# Patient Record
Sex: FEMALE | Race: WHITE | Employment: UNEMPLOYED | URBAN - METROPOLITAN AREA
[De-identification: names, ages, dates, MRNs, and addresses within clinical notes are randomized per-mention and may not be internally consistent; named-entity substitution may affect disease eponyms.]

---

## 2017-03-07 ENCOUNTER — HOSPITAL ENCOUNTER (OUTPATIENT)
Age: 82
Setting detail: SPECIMEN
Discharge: HOME | End: 2017-03-07
Payer: MEDICARE

## 2017-03-22 LAB
POC INR: 2.7
PROTHROMBIN TIME, POC: 32.1 SEC (ref 9.6–14.4)

## 2017-04-05 ENCOUNTER — HOSPITAL ENCOUNTER (OUTPATIENT)
Age: 82
Setting detail: SPECIMEN
Discharge: HOME | End: 2017-04-05
Payer: MEDICARE

## 2017-04-05 LAB
POC INR: 1.9
PROTHROMBIN TIME, POC: 22.3 SEC (ref 9.6–14.4)

## 2017-05-03 ENCOUNTER — HOSPITAL ENCOUNTER (OUTPATIENT)
Age: 82
Setting detail: SPECIMEN
Discharge: HOME | End: 2017-05-03
Payer: MEDICARE

## 2017-05-03 LAB
POC INR: 1.5
PROTHROMBIN TIME, POC: 18.5 SEC (ref 9.6–14.4)

## 2017-05-19 ENCOUNTER — HOSPITAL ENCOUNTER (OUTPATIENT)
Age: 82
Setting detail: SPECIMEN
Discharge: HOME | End: 2017-05-19
Payer: MEDICARE

## 2017-05-19 LAB
POC INR: 1.7
PROTHROMBIN TIME, POC: 20.4 SEC (ref 9.6–14.4)

## 2017-06-02 ENCOUNTER — HOSPITAL ENCOUNTER (OUTPATIENT)
Age: 82
Setting detail: SPECIMEN
Discharge: HOME | End: 2017-06-02
Payer: MEDICARE

## 2017-06-02 LAB
POC INR: 1.8
PROTHROMBIN TIME, POC: 21.1 SEC (ref 9.6–14.4)

## 2017-07-06 ENCOUNTER — HOSPITAL ENCOUNTER (OUTPATIENT)
Age: 82
Setting detail: SPECIMEN
Discharge: HOME | End: 2017-07-06
Payer: MEDICARE

## 2017-07-06 LAB
POC INR: 2
PROTHROMBIN TIME, POC: 23.9 SEC (ref 9.6–14.4)

## 2017-08-04 ENCOUNTER — HOSPITAL ENCOUNTER (OUTPATIENT)
Age: 82
Setting detail: SPECIMEN
Discharge: HOME | End: 2017-08-04
Payer: MEDICARE

## 2017-08-04 LAB
POC INR: 2
PROTHROMBIN TIME, POC: 24.2 SEC (ref 9.6–14.4)

## 2017-09-01 ENCOUNTER — HOSPITAL ENCOUNTER (OUTPATIENT)
Age: 82
Setting detail: SPECIMEN
Discharge: HOME | End: 2017-09-01
Payer: MEDICARE

## 2017-09-01 LAB
POC INR: 1.9
PROTHROMBIN TIME, POC: 22.5 SEC (ref 9.6–14.4)

## 2017-10-04 ENCOUNTER — HOSPITAL ENCOUNTER (OUTPATIENT)
Age: 82
Setting detail: SPECIMEN
Discharge: HOME | End: 2017-10-04
Payer: MEDICARE

## 2017-10-04 LAB
POC INR: 2
PROTHROMBIN TIME, POC: 24.3 SEC (ref 9.6–14.4)

## 2017-11-04 ENCOUNTER — HOSPITAL ENCOUNTER (INPATIENT)
Age: 82
LOS: 5 days | Discharge: NURSING, REHAB OR LONG-TERM CARE | DRG: 683 | End: 2017-11-09
Attending: EMERGENCY MEDICINE | Admitting: INTERNAL MEDICINE
Payer: MEDICARE

## 2017-11-04 ENCOUNTER — APPOINTMENT (OUTPATIENT)
Dept: GENERAL RADIOLOGY | Age: 82
DRG: 683 | End: 2017-11-04
Payer: MEDICARE

## 2017-11-04 DIAGNOSIS — R53.1 GENERALIZED WEAKNESS: Primary | ICD-10-CM

## 2017-11-04 DIAGNOSIS — M62.82 NON-TRAUMATIC RHABDOMYOLYSIS: ICD-10-CM

## 2017-11-04 DIAGNOSIS — R73.9 HYPERGLYCEMIA: ICD-10-CM

## 2017-11-04 DIAGNOSIS — E86.0 DEHYDRATION: ICD-10-CM

## 2017-11-04 DIAGNOSIS — R53.83 FATIGUE, UNSPECIFIED TYPE: ICD-10-CM

## 2017-11-04 PROBLEM — C50.919 BREAST CANCER (HCC): Status: ACTIVE | Noted: 2017-11-04

## 2017-11-04 PROBLEM — N17.9 AKI (ACUTE KIDNEY INJURY) (HCC): Status: ACTIVE | Noted: 2017-11-04

## 2017-11-04 PROBLEM — I70.1 RENAL ARTERY STENOSIS (HCC): Status: ACTIVE | Noted: 2017-11-04

## 2017-11-04 PROBLEM — H10.9 CONJUNCTIVITIS: Status: ACTIVE | Noted: 2017-11-04

## 2017-11-04 PROBLEM — N18.30 CKD (CHRONIC KIDNEY DISEASE) STAGE 3, GFR 30-59 ML/MIN (HCC): Status: ACTIVE | Noted: 2017-11-04

## 2017-11-04 PROBLEM — R74.8 ELEVATED CK: Status: ACTIVE | Noted: 2017-11-04

## 2017-11-04 PROBLEM — F02.80 ALZHEIMER DISEASE (HCC): Status: ACTIVE | Noted: 2017-11-04

## 2017-11-04 PROBLEM — E03.9 HYPOTHYROIDISM: Status: ACTIVE | Noted: 2017-11-04

## 2017-11-04 PROBLEM — I47.9 PAROXYSMAL TACHYCARDIA (HCC): Status: ACTIVE | Noted: 2017-11-04

## 2017-11-04 PROBLEM — I48.91 A-FIB (HCC): Status: ACTIVE | Noted: 2017-11-04

## 2017-11-04 PROBLEM — M81.0 OSTEOPOROSIS: Status: ACTIVE | Noted: 2017-11-04

## 2017-11-04 PROBLEM — E87.20 LACTIC ACID ACIDOSIS: Status: ACTIVE | Noted: 2017-11-04

## 2017-11-04 PROBLEM — R19.7 DIARRHEA: Status: ACTIVE | Noted: 2017-11-04

## 2017-11-04 PROBLEM — G30.9 ALZHEIMER DISEASE (HCC): Status: ACTIVE | Noted: 2017-11-04

## 2017-11-04 LAB
% CKMB: 1.9 % (ref 0–3)
-: ABNORMAL
ABSOLUTE EOS #: 0.07 K/UL (ref 0–0.44)
ABSOLUTE IMMATURE GRANULOCYTE: 0.03 K/UL (ref 0–0.3)
ABSOLUTE LYMPH #: 0.87 K/UL (ref 1.1–3.7)
ABSOLUTE MONO #: 0.61 K/UL (ref 0.1–1.2)
ALBUMIN SERPL-MCNC: 3.1 G/DL (ref 3.5–5.2)
ALBUMIN/GLOBULIN RATIO: 0.8 (ref 1–2.5)
ALP BLD-CCNC: 85 U/L (ref 35–104)
ALT SERPL-CCNC: 19 U/L (ref 5–33)
AMORPHOUS: ABNORMAL
ANION GAP SERPL CALCULATED.3IONS-SCNC: 18 MMOL/L (ref 9–17)
AST SERPL-CCNC: 49 U/L
BACTERIA: ABNORMAL
BASOPHILS # BLD: 1 % (ref 0–2)
BASOPHILS ABSOLUTE: 0.05 K/UL (ref 0–0.2)
BILIRUB SERPL-MCNC: 0.38 MG/DL (ref 0.3–1.2)
BILIRUBIN DIRECT: 0.12 MG/DL
BILIRUBIN URINE: NEGATIVE
BILIRUBIN, INDIRECT: 0.26 MG/DL (ref 0–1)
BNP INTERPRETATION: ABNORMAL
BUN BLDV-MCNC: 81 MG/DL (ref 8–23)
BUN/CREAT BLD: ABNORMAL (ref 9–20)
CALCIUM IONIZED: 1.21 MMOL/L (ref 1.13–1.33)
CALCIUM SERPL-MCNC: 9 MG/DL (ref 8.6–10.4)
CASTS UA: ABNORMAL /LPF (ref 0–8)
CHLORIDE BLD-SCNC: 95 MMOL/L (ref 98–107)
CK MB: 49.5 NG/ML
CKMB INTERPRETATION: ABNORMAL
CO2: 21 MMOL/L (ref 20–31)
COLOR: YELLOW
CREAT SERPL-MCNC: 2.41 MG/DL (ref 0.5–0.9)
CREATININE URINE: 71.4 MG/DL (ref 28–217)
CRYSTALS, UA: ABNORMAL /HPF
DIFFERENTIAL TYPE: ABNORMAL
DIRECT EXAM: NORMAL
EOSINOPHILS RELATIVE PERCENT: 1 % (ref 1–4)
EPITHELIAL CELLS UA: ABNORMAL /HPF (ref 0–5)
GFR AFRICAN AMERICAN: 23 ML/MIN
GFR NON-AFRICAN AMERICAN: 19 ML/MIN
GFR SERPL CREATININE-BSD FRML MDRD: ABNORMAL ML/MIN/{1.73_M2}
GFR SERPL CREATININE-BSD FRML MDRD: ABNORMAL ML/MIN/{1.73_M2}
GLOBULIN: ABNORMAL G/DL (ref 1.5–3.8)
GLUCOSE BLD-MCNC: 264 MG/DL (ref 70–99)
GLUCOSE URINE: NEGATIVE
HCT VFR BLD CALC: 42.7 % (ref 36.3–47.1)
HEMOGLOBIN: 13.1 G/DL (ref 11.9–15.1)
IMMATURE GRANULOCYTES: 0 %
INR BLD: 2.2
KETONES, URINE: NEGATIVE
LACTIC ACID, WHOLE BLOOD: 3.4 MMOL/L (ref 0.7–2.1)
LEUKOCYTE ESTERASE, URINE: ABNORMAL
LIPASE: 35 U/L (ref 13–60)
LYMPHOCYTES # BLD: 9 % (ref 24–43)
Lab: NORMAL
MAGNESIUM: 2.3 MG/DL (ref 1.6–2.6)
MCH RBC QN AUTO: 25.7 PG (ref 25.2–33.5)
MCHC RBC AUTO-ENTMCNC: 30.7 G/DL (ref 28.4–34.8)
MCV RBC AUTO: 83.7 FL (ref 82.6–102.9)
MONOCYTES # BLD: 7 % (ref 3–12)
MUCUS: ABNORMAL
NITRITE, URINE: NEGATIVE
OTHER OBSERVATIONS UA: ABNORMAL
PDW BLD-RTO: 13.5 % (ref 11.8–14.4)
PH UA: 5 (ref 5–8)
PHOSPHORUS: 2.9 MG/DL (ref 2.6–4.5)
PLATELET # BLD: 290 K/UL (ref 138–453)
PLATELET ESTIMATE: ABNORMAL
PMV BLD AUTO: 10.8 FL (ref 8.1–13.5)
POC TROPONIN I: 0.06 NG/ML (ref 0–0.1)
POC TROPONIN INTERP: NORMAL
POTASSIUM SERPL-SCNC: 3.5 MMOL/L (ref 3.7–5.3)
PRO-BNP: ABNORMAL PG/ML
PROTEIN UA: ABNORMAL
PROTHROMBIN TIME: 23.4 SEC (ref 9.4–12.6)
RBC # BLD: 5.1 M/UL (ref 3.95–5.11)
RBC # BLD: ABNORMAL 10*6/UL
RBC UA: ABNORMAL /HPF (ref 0–4)
RENAL EPITHELIAL, UA: ABNORMAL /HPF
SEG NEUTROPHILS: 82 % (ref 36–65)
SEGMENTED NEUTROPHILS ABSOLUTE COUNT: 7.64 K/UL (ref 1.5–8.1)
SODIUM BLD-SCNC: 134 MMOL/L (ref 135–144)
SODIUM,UR: 57 MMOL/L
SPECIFIC GRAVITY UA: 1.01 (ref 1–1.03)
SPECIMEN DESCRIPTION: NORMAL
STATUS: NORMAL
TOTAL CK: 2571 U/L (ref 26–192)
TOTAL PROTEIN: 7 G/DL (ref 6.4–8.3)
TRICHOMONAS: ABNORMAL
TSH SERPL DL<=0.05 MIU/L-ACNC: 2.33 MIU/L (ref 0.3–5)
TURBIDITY: CLEAR
URINE HGB: ABNORMAL
UROBILINOGEN, URINE: NORMAL
WBC # BLD: 9.3 K/UL (ref 3.5–11.3)
WBC # BLD: ABNORMAL 10*3/UL
WBC UA: ABNORMAL /HPF (ref 0–5)
YEAST: ABNORMAL

## 2017-11-04 PROCEDURE — 2580000003 HC RX 258: Performed by: EMERGENCY MEDICINE

## 2017-11-04 PROCEDURE — 87040 BLOOD CULTURE FOR BACTERIA: CPT

## 2017-11-04 PROCEDURE — 84100 ASSAY OF PHOSPHORUS: CPT

## 2017-11-04 PROCEDURE — 83605 ASSAY OF LACTIC ACID: CPT

## 2017-11-04 PROCEDURE — 83880 ASSAY OF NATRIURETIC PEPTIDE: CPT

## 2017-11-04 PROCEDURE — 80048 BASIC METABOLIC PNL TOTAL CA: CPT

## 2017-11-04 PROCEDURE — 83735 ASSAY OF MAGNESIUM: CPT

## 2017-11-04 PROCEDURE — 93005 ELECTROCARDIOGRAM TRACING: CPT

## 2017-11-04 PROCEDURE — 85610 PROTHROMBIN TIME: CPT

## 2017-11-04 PROCEDURE — 1200000000 HC SEMI PRIVATE

## 2017-11-04 PROCEDURE — 84443 ASSAY THYROID STIM HORMONE: CPT

## 2017-11-04 PROCEDURE — 83690 ASSAY OF LIPASE: CPT

## 2017-11-04 PROCEDURE — 82570 ASSAY OF URINE CREATININE: CPT

## 2017-11-04 PROCEDURE — 71010 XR CHEST PORTABLE: CPT

## 2017-11-04 PROCEDURE — 87804 INFLUENZA ASSAY W/OPTIC: CPT

## 2017-11-04 PROCEDURE — 80076 HEPATIC FUNCTION PANEL: CPT

## 2017-11-04 PROCEDURE — 82550 ASSAY OF CK (CPK): CPT

## 2017-11-04 PROCEDURE — 82553 CREATINE MB FRACTION: CPT

## 2017-11-04 PROCEDURE — 99285 EMERGENCY DEPT VISIT HI MDM: CPT

## 2017-11-04 PROCEDURE — 36415 COLL VENOUS BLD VENIPUNCTURE: CPT

## 2017-11-04 PROCEDURE — 6370000000 HC RX 637 (ALT 250 FOR IP): Performed by: HOSPITALIST

## 2017-11-04 PROCEDURE — 82330 ASSAY OF CALCIUM: CPT

## 2017-11-04 PROCEDURE — 87205 SMEAR GRAM STAIN: CPT

## 2017-11-04 PROCEDURE — 84484 ASSAY OF TROPONIN QUANT: CPT

## 2017-11-04 PROCEDURE — 84300 ASSAY OF URINE SODIUM: CPT

## 2017-11-04 PROCEDURE — 85025 COMPLETE CBC W/AUTO DIFF WBC: CPT

## 2017-11-04 PROCEDURE — 2580000003 HC RX 258: Performed by: HOSPITALIST

## 2017-11-04 PROCEDURE — 81001 URINALYSIS AUTO W/SCOPE: CPT

## 2017-11-04 RX ORDER — LEVOTHYROXINE SODIUM 0.07 MG/1
75 TABLET ORAL DAILY
COMMUNITY

## 2017-11-04 RX ORDER — WARFARIN SODIUM 2.5 MG/1
2.5 TABLET ORAL
Status: DISCONTINUED | OUTPATIENT
Start: 2017-11-07 | End: 2017-11-05

## 2017-11-04 RX ORDER — EXEMESTANE 25 MG/1
25 TABLET ORAL DAILY
COMMUNITY

## 2017-11-04 RX ORDER — HEPARIN SODIUM 5000 [USP'U]/ML
5000 INJECTION, SOLUTION INTRAVENOUS; SUBCUTANEOUS EVERY 8 HOURS SCHEDULED
Status: DISCONTINUED | OUTPATIENT
Start: 2017-11-04 | End: 2017-11-04

## 2017-11-04 RX ORDER — DONEPEZIL HYDROCHLORIDE 10 MG/1
10 TABLET, FILM COATED ORAL 2 TIMES DAILY
Status: DISCONTINUED | OUTPATIENT
Start: 2017-11-04 | End: 2017-11-09 | Stop reason: HOSPADM

## 2017-11-04 RX ORDER — 0.9 % SODIUM CHLORIDE 0.9 %
750 INTRAVENOUS SOLUTION INTRAVENOUS ONCE
Status: COMPLETED | OUTPATIENT
Start: 2017-11-04 | End: 2017-11-04

## 2017-11-04 RX ORDER — WARFARIN SODIUM 5 MG/1
5 TABLET ORAL
Status: DISCONTINUED | OUTPATIENT
Start: 2017-11-04 | End: 2017-11-05

## 2017-11-04 RX ORDER — 0.9 % SODIUM CHLORIDE 0.9 %
1000 INTRAVENOUS SOLUTION INTRAVENOUS ONCE
Status: COMPLETED | OUTPATIENT
Start: 2017-11-04 | End: 2017-11-04

## 2017-11-04 RX ORDER — LEVOTHYROXINE SODIUM 0.07 MG/1
75 TABLET ORAL DAILY
Status: DISCONTINUED | OUTPATIENT
Start: 2017-11-05 | End: 2017-11-09 | Stop reason: HOSPADM

## 2017-11-04 RX ORDER — SODIUM CHLORIDE 0.9 % (FLUSH) 0.9 %
10 SYRINGE (ML) INJECTION PRN
Status: DISCONTINUED | OUTPATIENT
Start: 2017-11-04 | End: 2017-11-06 | Stop reason: SDUPTHER

## 2017-11-04 RX ORDER — WARFARIN SODIUM 5 MG/1
5 TABLET ORAL
COMMUNITY

## 2017-11-04 RX ORDER — SODIUM CHLORIDE 0.9 % (FLUSH) 0.9 %
10 SYRINGE (ML) INJECTION EVERY 12 HOURS SCHEDULED
Status: DISCONTINUED | OUTPATIENT
Start: 2017-11-04 | End: 2017-11-06 | Stop reason: SDUPTHER

## 2017-11-04 RX ORDER — LEVOTHYROXINE SODIUM ANHYDROUS 100 UG/5ML
75 INJECTION, POWDER, LYOPHILIZED, FOR SOLUTION INTRAVENOUS
Status: ON HOLD | COMMUNITY
End: 2017-11-04 | Stop reason: CLARIF

## 2017-11-04 RX ORDER — SODIUM CHLORIDE 9 MG/ML
INJECTION, SOLUTION INTRAVENOUS CONTINUOUS
Status: DISCONTINUED | OUTPATIENT
Start: 2017-11-04 | End: 2017-11-04

## 2017-11-04 RX ORDER — WARFARIN SODIUM 5 MG/1
5 TABLET ORAL DAILY
Status: DISCONTINUED | OUTPATIENT
Start: 2017-11-05 | End: 2017-11-04

## 2017-11-04 RX ORDER — HYDROCHLOROTHIAZIDE 25 MG/1
25 TABLET ORAL DAILY
Status: ON HOLD | COMMUNITY
End: 2017-11-09 | Stop reason: HOSPADM

## 2017-11-04 RX ORDER — ACETAMINOPHEN 325 MG/1
650 TABLET ORAL EVERY 4 HOURS PRN
Status: DISCONTINUED | OUTPATIENT
Start: 2017-11-04 | End: 2017-11-09 | Stop reason: HOSPADM

## 2017-11-04 RX ORDER — SODIUM CHLORIDE 0.9 % (FLUSH) 0.9 %
10 SYRINGE (ML) INJECTION PRN
Status: DISCONTINUED | OUTPATIENT
Start: 2017-11-04 | End: 2017-11-09 | Stop reason: HOSPADM

## 2017-11-04 RX ORDER — FAMOTIDINE 20 MG/1
20 TABLET, FILM COATED ORAL 2 TIMES DAILY
Status: DISCONTINUED | OUTPATIENT
Start: 2017-11-04 | End: 2017-11-06

## 2017-11-04 RX ORDER — ALENDRONATE SODIUM 70 MG/1
70 TABLET ORAL
COMMUNITY

## 2017-11-04 RX ORDER — DONEPEZIL HYDROCHLORIDE 10 MG/1
10 TABLET, FILM COATED ORAL 2 TIMES DAILY
COMMUNITY

## 2017-11-04 RX ORDER — ACETAMINOPHEN 325 MG/1
650 TABLET ORAL EVERY 4 HOURS PRN
Status: DISCONTINUED | OUTPATIENT
Start: 2017-11-04 | End: 2017-11-06 | Stop reason: SDUPTHER

## 2017-11-04 RX ORDER — LISINOPRIL 20 MG/1
20 TABLET ORAL DAILY
Status: ON HOLD | COMMUNITY
End: 2017-11-09 | Stop reason: HOSPADM

## 2017-11-04 RX ORDER — SODIUM CHLORIDE 9 MG/ML
INJECTION, SOLUTION INTRAVENOUS CONTINUOUS
Status: DISCONTINUED | OUTPATIENT
Start: 2017-11-04 | End: 2017-11-09

## 2017-11-04 RX ORDER — METOPROLOL SUCCINATE 25 MG/1
25 TABLET, EXTENDED RELEASE ORAL 2 TIMES DAILY
Status: ON HOLD | COMMUNITY
End: 2017-11-09 | Stop reason: HOSPADM

## 2017-11-04 RX ORDER — METOPROLOL TARTRATE 5 MG/5ML
5 INJECTION INTRAVENOUS EVERY 6 HOURS PRN
Status: DISCONTINUED | OUTPATIENT
Start: 2017-11-04 | End: 2017-11-05

## 2017-11-04 RX ORDER — SODIUM CHLORIDE 0.9 % (FLUSH) 0.9 %
10 SYRINGE (ML) INJECTION EVERY 12 HOURS SCHEDULED
Status: DISCONTINUED | OUTPATIENT
Start: 2017-11-04 | End: 2017-11-09 | Stop reason: HOSPADM

## 2017-11-04 RX ORDER — ASPIRIN 81 MG/1
81 TABLET, CHEWABLE ORAL DAILY
Status: DISCONTINUED | OUTPATIENT
Start: 2017-11-05 | End: 2017-11-09 | Stop reason: HOSPADM

## 2017-11-04 RX ORDER — ONDANSETRON 2 MG/ML
4 INJECTION INTRAMUSCULAR; INTRAVENOUS EVERY 6 HOURS PRN
Status: DISCONTINUED | OUTPATIENT
Start: 2017-11-04 | End: 2017-11-09 | Stop reason: HOSPADM

## 2017-11-04 RX ORDER — ONDANSETRON 4 MG/1
4 TABLET, FILM COATED ORAL EVERY 8 HOURS PRN
Status: DISCONTINUED | OUTPATIENT
Start: 2017-11-04 | End: 2017-11-04

## 2017-11-04 RX ORDER — CIPROFLOXACIN HYDROCHLORIDE 3.5 MG/ML
1 SOLUTION/ DROPS TOPICAL
Status: DISCONTINUED | OUTPATIENT
Start: 2017-11-04 | End: 2017-11-09 | Stop reason: HOSPADM

## 2017-11-04 RX ADMIN — WARFARIN SODIUM 5 MG: 5 TABLET ORAL at 22:39

## 2017-11-04 RX ADMIN — FAMOTIDINE 20 MG: 20 TABLET, FILM COATED ORAL at 22:39

## 2017-11-04 RX ADMIN — SODIUM CHLORIDE 1000 ML: 9 INJECTION, SOLUTION INTRAVENOUS at 16:54

## 2017-11-04 RX ADMIN — SODIUM CHLORIDE: 9 INJECTION, SOLUTION INTRAVENOUS at 22:41

## 2017-11-04 RX ADMIN — CIPROFLOXACIN HYDROCHLORIDE 1 DROP: 3.5 SOLUTION/ DROPS TOPICAL at 22:39

## 2017-11-04 RX ADMIN — SODIUM CHLORIDE 750 ML: 9 INJECTION, SOLUTION INTRAVENOUS at 18:59

## 2017-11-04 RX ADMIN — DONEPEZIL HYDROCHLORIDE 10 MG: 10 TABLET, FILM COATED ORAL at 22:39

## 2017-11-04 ASSESSMENT — PAIN SCALES - GENERAL: PAINLEVEL_OUTOF10: 0

## 2017-11-04 NOTE — ED NOTES
Bed: 07  Expected date: 11/4/17  Expected time: 4:31 PM  Means of arrival: Ambulance  Comments:  TALA [facility], RN  11/04/17 9082

## 2017-11-04 NOTE — ED PROVIDER NOTES
tachypneic with shallow. Lungs are clear. Abdomen is soft and nontender. Skin is cool but dry. Normal capillary refill. Oropharynx is moist without lesion. There is right periorbital injection with drainage compatible with blepharitis possible conjunctivitis. There is normal pupils cranial nerves extraocular movements and normal motor strength. Laboratory studies reveal mild elevation in lactate, marked elevation in BUN as well as moderate elevation in creatinine. Impression is dehydration,  Diarrhea, SIRS, no evidence of infection as yet, though urine is pending. Christina Zavala.  Jordan Hubbard MD, [facility]  Attending Emergency  Physician                Sherry Cazares MD  11/04/17 7202

## 2017-11-05 ENCOUNTER — APPOINTMENT (OUTPATIENT)
Dept: ULTRASOUND IMAGING | Age: 82
DRG: 683 | End: 2017-11-05
Payer: MEDICARE

## 2017-11-05 LAB
ABSOLUTE EOS #: 0.08 K/UL (ref 0–0.44)
ABSOLUTE IMMATURE GRANULOCYTE: 0.05 K/UL (ref 0–0.3)
ABSOLUTE LYMPH #: 0.76 K/UL (ref 1.1–3.7)
ABSOLUTE MONO #: 0.6 K/UL (ref 0.1–1.2)
ANION GAP SERPL CALCULATED.3IONS-SCNC: 14 MMOL/L (ref 9–17)
BASOPHILS # BLD: 1 % (ref 0–2)
BASOPHILS ABSOLUTE: 0.04 K/UL (ref 0–0.2)
BUN BLDV-MCNC: 63 MG/DL (ref 8–23)
BUN/CREAT BLD: ABNORMAL (ref 9–20)
CALCIUM SERPL-MCNC: 8.3 MG/DL (ref 8.6–10.4)
CHLORIDE BLD-SCNC: 103 MMOL/L (ref 98–107)
CO2: 20 MMOL/L (ref 20–31)
CREAT SERPL-MCNC: 2.02 MG/DL (ref 0.5–0.9)
DIFFERENTIAL TYPE: ABNORMAL
EOSINOPHIL,URINE: NORMAL
EOSINOPHILS RELATIVE PERCENT: 1 % (ref 1–4)
ESTIMATED AVERAGE GLUCOSE: 126 MG/DL
GFR AFRICAN AMERICAN: 28 ML/MIN
GFR NON-AFRICAN AMERICAN: 23 ML/MIN
GFR SERPL CREATININE-BSD FRML MDRD: ABNORMAL ML/MIN/{1.73_M2}
GFR SERPL CREATININE-BSD FRML MDRD: ABNORMAL ML/MIN/{1.73_M2}
GLUCOSE BLD-MCNC: 132 MG/DL (ref 70–99)
HBA1C MFR BLD: 6 % (ref 4–6)
HCT VFR BLD CALC: 36.3 % (ref 36.3–47.1)
HEMOGLOBIN: 11.1 G/DL (ref 11.9–15.1)
IMMATURE GRANULOCYTES: 1 %
INR BLD: 2.6
LACTIC ACID, WHOLE BLOOD: 1.5 MMOL/L (ref 0.7–2.1)
LACTIC ACID, WHOLE BLOOD: 2.4 MMOL/L (ref 0.7–2.1)
LACTIC ACID: NORMAL MMOL/L
LYMPHOCYTES # BLD: 9 % (ref 24–43)
MCH RBC QN AUTO: 25.7 PG (ref 25.2–33.5)
MCHC RBC AUTO-ENTMCNC: 30.6 G/DL (ref 28.4–34.8)
MCV RBC AUTO: 84 FL (ref 82.6–102.9)
MONOCYTES # BLD: 7 % (ref 3–12)
PDW BLD-RTO: 13.7 % (ref 11.8–14.4)
PLATELET # BLD: 259 K/UL (ref 138–453)
PLATELET ESTIMATE: ABNORMAL
PMV BLD AUTO: 11.3 FL (ref 8.1–13.5)
POTASSIUM SERPL-SCNC: 3.6 MMOL/L (ref 3.7–5.3)
PROTHROMBIN TIME: 28.5 SEC (ref 9.4–12.6)
RBC # BLD: 4.32 M/UL (ref 3.95–5.11)
RBC # BLD: ABNORMAL 10*6/UL
SEG NEUTROPHILS: 81 % (ref 36–65)
SEGMENTED NEUTROPHILS ABSOLUTE COUNT: 6.64 K/UL (ref 1.5–8.1)
SODIUM BLD-SCNC: 137 MMOL/L (ref 135–144)
TOTAL CK: 1038 U/L (ref 26–192)
WBC # BLD: 8.2 K/UL (ref 3.5–11.3)
WBC # BLD: ABNORMAL 10*3/UL

## 2017-11-05 PROCEDURE — 6370000000 HC RX 637 (ALT 250 FOR IP): Performed by: STUDENT IN AN ORGANIZED HEALTH CARE EDUCATION/TRAINING PROGRAM

## 2017-11-05 PROCEDURE — 85610 PROTHROMBIN TIME: CPT

## 2017-11-05 PROCEDURE — 97162 PT EVAL MOD COMPLEX 30 MIN: CPT

## 2017-11-05 PROCEDURE — 6370000000 HC RX 637 (ALT 250 FOR IP): Performed by: HOSPITALIST

## 2017-11-05 PROCEDURE — 2580000003 HC RX 258: Performed by: INTERNAL MEDICINE

## 2017-11-05 PROCEDURE — 80048 BASIC METABOLIC PNL TOTAL CA: CPT

## 2017-11-05 PROCEDURE — 36415 COLL VENOUS BLD VENIPUNCTURE: CPT

## 2017-11-05 PROCEDURE — 85025 COMPLETE CBC W/AUTO DIFF WBC: CPT

## 2017-11-05 PROCEDURE — 87186 SC STD MICRODIL/AGAR DIL: CPT

## 2017-11-05 PROCEDURE — 87088 URINE BACTERIA CULTURE: CPT

## 2017-11-05 PROCEDURE — 2500000003 HC RX 250 WO HCPCS

## 2017-11-05 PROCEDURE — G8978 MOBILITY CURRENT STATUS: HCPCS

## 2017-11-05 PROCEDURE — 2500000003 HC RX 250 WO HCPCS: Performed by: HOSPITALIST

## 2017-11-05 PROCEDURE — 2580000003 HC RX 258: Performed by: HOSPITALIST

## 2017-11-05 PROCEDURE — 97530 THERAPEUTIC ACTIVITIES: CPT

## 2017-11-05 PROCEDURE — 51798 US URINE CAPACITY MEASURE: CPT

## 2017-11-05 PROCEDURE — 2060000000 HC ICU INTERMEDIATE R&B

## 2017-11-05 PROCEDURE — G8979 MOBILITY GOAL STATUS: HCPCS

## 2017-11-05 PROCEDURE — 83605 ASSAY OF LACTIC ACID: CPT

## 2017-11-05 PROCEDURE — 82550 ASSAY OF CK (CPK): CPT

## 2017-11-05 PROCEDURE — 87086 URINE CULTURE/COLONY COUNT: CPT

## 2017-11-05 PROCEDURE — 83036 HEMOGLOBIN GLYCOSYLATED A1C: CPT

## 2017-11-05 PROCEDURE — 99223 1ST HOSP IP/OBS HIGH 75: CPT | Performed by: INTERNAL MEDICINE

## 2017-11-05 PROCEDURE — 76770 US EXAM ABDO BACK WALL COMP: CPT

## 2017-11-05 RX ORDER — 0.9 % SODIUM CHLORIDE 0.9 %
250 INTRAVENOUS SOLUTION INTRAVENOUS ONCE
Status: COMPLETED | OUTPATIENT
Start: 2017-11-05 | End: 2017-11-05

## 2017-11-05 RX ORDER — LABETALOL HYDROCHLORIDE 5 MG/ML
INJECTION, SOLUTION INTRAVENOUS
Status: COMPLETED
Start: 2017-11-05 | End: 2017-11-05

## 2017-11-05 RX ORDER — POTASSIUM CHLORIDE 20 MEQ/1
40 TABLET, EXTENDED RELEASE ORAL PRN
Status: DISCONTINUED | OUTPATIENT
Start: 2017-11-05 | End: 2017-11-09 | Stop reason: HOSPADM

## 2017-11-05 RX ORDER — CIPROFLOXACIN 250 MG/1
250 TABLET, FILM COATED ORAL EVERY 12 HOURS SCHEDULED
Status: DISCONTINUED | OUTPATIENT
Start: 2017-11-05 | End: 2017-11-09 | Stop reason: HOSPADM

## 2017-11-05 RX ORDER — POTASSIUM CHLORIDE 7.45 MG/ML
10 INJECTION INTRAVENOUS PRN
Status: DISCONTINUED | OUTPATIENT
Start: 2017-11-05 | End: 2017-11-09 | Stop reason: HOSPADM

## 2017-11-05 RX ORDER — METOPROLOL TARTRATE 5 MG/5ML
5 INJECTION INTRAVENOUS EVERY 6 HOURS PRN
Status: DISCONTINUED | OUTPATIENT
Start: 2017-11-05 | End: 2017-11-09 | Stop reason: HOSPADM

## 2017-11-05 RX ORDER — POTASSIUM CHLORIDE 20MEQ/15ML
40 LIQUID (ML) ORAL PRN
Status: DISCONTINUED | OUTPATIENT
Start: 2017-11-05 | End: 2017-11-09 | Stop reason: HOSPADM

## 2017-11-05 RX ORDER — WARFARIN SODIUM 1 MG/1
1 TABLET ORAL
Status: COMPLETED | OUTPATIENT
Start: 2017-11-05 | End: 2017-11-05

## 2017-11-05 RX ADMIN — ASPIRIN 81 MG: 81 TABLET, CHEWABLE ORAL at 08:08

## 2017-11-05 RX ADMIN — CIPROFLOXACIN HYDROCHLORIDE 1 DROP: 3.5 SOLUTION/ DROPS TOPICAL at 08:07

## 2017-11-05 RX ADMIN — Medication 10 ML: at 21:49

## 2017-11-05 RX ADMIN — LABETALOL HYDROCHLORIDE: 5 INJECTION, SOLUTION INTRAVENOUS at 05:00

## 2017-11-05 RX ADMIN — SODIUM CHLORIDE 250 ML: 0.9 INJECTION, SOLUTION INTRAVENOUS at 06:41

## 2017-11-05 RX ADMIN — METOROPROLOL TARTRATE 5 MG: 5 INJECTION, SOLUTION INTRAVENOUS at 00:44

## 2017-11-05 RX ADMIN — SODIUM CHLORIDE 250 ML: 9 INJECTION, SOLUTION INTRAVENOUS at 05:30

## 2017-11-05 RX ADMIN — WARFARIN SODIUM 1 MG: 1 TABLET ORAL at 17:41

## 2017-11-05 RX ADMIN — SODIUM CHLORIDE 250 ML: 9 INJECTION, SOLUTION INTRAVENOUS at 08:02

## 2017-11-05 RX ADMIN — DONEPEZIL HYDROCHLORIDE 10 MG: 10 TABLET, FILM COATED ORAL at 21:49

## 2017-11-05 RX ADMIN — FAMOTIDINE 20 MG: 20 TABLET, FILM COATED ORAL at 08:08

## 2017-11-05 RX ADMIN — DONEPEZIL HYDROCHLORIDE 10 MG: 10 TABLET, FILM COATED ORAL at 08:07

## 2017-11-05 RX ADMIN — SODIUM CHLORIDE 250 ML: 9 INJECTION, SOLUTION INTRAVENOUS at 08:54

## 2017-11-05 RX ADMIN — CIPROFLOXACIN HYDROCHLORIDE 1 DROP: 3.5 SOLUTION/ DROPS TOPICAL at 13:41

## 2017-11-05 RX ADMIN — LEVOTHYROXINE SODIUM 75 MCG: 75 TABLET ORAL at 08:08

## 2017-11-05 RX ADMIN — FAMOTIDINE 20 MG: 20 TABLET, FILM COATED ORAL at 21:49

## 2017-11-05 RX ADMIN — CIPROFLOXACIN 250 MG: 250 TABLET, FILM COATED ORAL at 13:41

## 2017-11-05 RX ADMIN — CIPROFLOXACIN HYDROCHLORIDE 1 DROP: 3.5 SOLUTION/ DROPS TOPICAL at 05:13

## 2017-11-05 RX ADMIN — SODIUM CHLORIDE: 9 INJECTION, SOLUTION INTRAVENOUS at 13:45

## 2017-11-05 RX ADMIN — CIPROFLOXACIN HYDROCHLORIDE 1 DROP: 3.5 SOLUTION/ DROPS TOPICAL at 21:49

## 2017-11-05 ASSESSMENT — PAIN SCALES - GENERAL
PAINLEVEL_OUTOF10: 0
PAINLEVEL_OUTOF10: 0

## 2017-11-05 NOTE — PROGRESS NOTES
Pharmacy Note  Warfarin Consult follow-up      Recent Labs      11/05/17   0642   INR  2.6     Recent Labs      11/04/17   1658  11/05/17   0642   HGB  13.1  11.1*   HCT  42.7  36.3   PLT  290  259       Significant Drug-Drug Interactions:  New warfarin drug-drug interactions: Cipro  Discontinued drug-drug interactions: N/A    Date           INR                Dose  11/4/17       2.2                 5 mg  11/5/17       2.6                  1 mg    Notes:                     Patient's INR increased rapidly over past 24 hours, despite yesterday's 5 mg dose not being fully reflected in today's INR reading. Will decrease dose to 1 mg PO tonight, as the increase in INR may be contributed to evelated Pro-BNP as well as a drug-drug interaction with Cipro. Daily PT/INR while inpatient. Alexei Alfonso PharmWayne D.  11/5/2017 11:10 AM

## 2017-11-05 NOTE — PLAN OF CARE
Problem: Falls - Risk of  Goal: Absence of falls  Outcome: Ongoing      Problem: Risk for Impaired Skin Integrity  Goal: Tissue integrity - skin and mucous membranes  Structural intactness and normal physiological function of skin and  mucous membranes.    Outcome: Ongoing      Comments: Electronically signed by Amanda Mendez RN on 11/5/2017 at 6:34 AM

## 2017-11-05 NOTE — PROGRESS NOTES
Physical Therapy    Facility/Department: [facility] CAR 3  Initial Assessment    NAME: Shane Garcia  : 10/10/1927  MRN: [id number]    Date of Service: 2017  Shane Garcia is a 80 y.o. with PMH of   - CKD stage III  - Renal artery stenosis s/p stent placement   - A fib on Coumadin   - Alzheimer disease   - Osteoporosis   - Hypothyroidism       Presented to ER with difficulty ambulating and fatigue. Pt's brother at bedside, reporting that pt has had loose stool for the past 3 days. She also had decrease oral intake. No associated fever, chills, nausea or vomiting. No hematochezia or melena. Pt has stress incontinence and wears briefs, no reported change in urinary frequency or dysuria.      Upon admission, pt was A&O, hypotensive (BP 98/58), tachycardic () and afebrile.      Admission labs showed Lactic of 3.5, elevated CK (2,571) CK-MB (49.5), elevated pro-BNP (27,272)  Pt denies any hx of diagnosed CHF. Patient Diagnosis(es): The primary encounter diagnosis was Generalized weakness. Diagnoses of Dehydration, Fatigue, unspecified type, Non-traumatic rhabdomyolysis, and Hyperglycemia were also pertinent to this visit. has a past medical history of Atrial fibrillation ([facility] [address] Cancer ([facility] [address] and Dementia. has a past surgical history that includes Breast surgery.     Restrictions  Restrictions/Precautions  Restrictions/Precautions: Cardiac, Fall Risk (orthostatic--monitor BP/HR)  Required Braces or Orthoses?: No  Vision/Hearing  Vision: Within Functional Limits  Hearing: Exceptions to [facility]  Hearing Exceptions: Hard of hearing/hearing concerns     Subjective  General  Patient assessed for rehabilitation services?: Yes  Response To Previous Treatment: Not applicable  Family / Caregiver Present: No  Follows Commands: Impaired (needs occasional visual/tactile cues)  Pain Screening  Patient Currently in Pain: Denies    Orientation  Orientation  Overall Orientation Status: Impaired  Orientation Level: Oriented to place;Oriented to person;Disoriented to time;Disoriented to situation    Social/Functional History  Social/Functional History  Lives With: Son  Type of Home: House  Home Layout: Laundry in basement  Home Access: Stairs to enter with rails (3)  Entrance Stairs - Number of Steps:  (1 HR)  Home Equipment: Rolling walker, Cane, Standard walker  Receives Help From: Family  ADL Assistance: Independent  Homemaking Assistance: Needs assistance  Homemaking Responsibilities: No  Ambulation Assistance: Independent  Transfer Assistance: Independent  Active : No  Mode of Transportation: Family, Car  Additional Comments:  (pt states son cooks and does laundry, granddtr cleans once a week)   Unable to assess accuracy of information given--pt is confused  Objective          AROM RLE (degrees)  RLE AROM: WFL  AROM LLE (degrees)  LLE AROM : WFL  AROM RUE (degrees)  RUE AROM : WFL  AROM LUE (degrees)  LUE AROM : WFL  Strength   Grossly 3+/5 throughout  Tone RLE  RLE Tone: Normotonic  Tone LLE  LLE Tone: Normotonic  Motor Control  Gross Motor?: WFL  Sensation  Overall Sensation Status: WFL  Bed mobility  Rolling to Left: Minimal assistance  Supine to Sit: Moderate assistance  Scooting: Minimal assistance  Transfers  Sit to Stand: Moderate Assistance  Stand to sit: Moderate Assistance  Bed to Chair: Moderate to Maximum assistance+1  Stand Pivot Transfers: Maximum Assistance+1 (moderate assist if allowed to use rw)  Ambulation  Ambulation?: Yes  Ambulation 1  Surface: level tile  Device: Rolling Walker  Assistance:  Moderate assistance  Quality of Gait: flexed posture, wide KRYSTYNA, takes very small steps  Distance: ~8 steps bed to chair; ~8 steps chair to BSC, and BSC back to chair  Stairs/Curb  Stairs?: No     Balance  Posture: Poor  Sitting - Static: Fair  Sitting - Dynamic: Fair  Standing - Static: Poor  Standing - Dynamic: Poor        Assessment    BP in bed upon PT arrival :91/45; retaken  65/40; BP cuff removed and replaced 85/43; dangling 92/51 ; up to chair 93/56; up to [facility] 98/57, HR upper 160's/lower 170's (RN present and aware); back to chair 98/78 's (low) Pt pleasantly confused, cooperative, denied dizziness or feeling like she was going to pass out. Did admit to mild SOB when asked, but \"not bad\"--stated no worse with activity than with rest.   Body structures, Functions, Activity limitations: Decreased functional mobility ; Decreased strength;Decreased safe awareness;Decreased cognition;Decreased endurance;Decreased balance  Prognosis: Good  Decision Making: Medium Complexity  Patient Education: importance of mobility within heart limitations  Barriers to Learning: dementia  REQUIRES PT FOLLOW UP: Yes  Activity Tolerance  Activity Tolerance: Treatment limited secondary to medical complications (free text) (pt's BP remained low, but pt asymptomatic; she did become tachycardic upon standing and transferring to the chair and BSC--RN aware)  PT Equipment Recommendations  Equipment Needed:  (TBD)     Discharge Recommendations:  Continue to assess pending progress, Subacute/Skilled [address]  Times per week: 6 visits weekly  Times per day: Daily  Current Treatment Recommendations: Strengthening, ROM, Balance Training, Functional Mobility Training, Transfer Training, Cognitive/Perceptual Training, Endurance Training, Gait Training, Stair training, Cognitive Reorientation, Home Exercise Program, Safety Education & Training, Patient/Caregiver Education & Training, Equipment Evaluation, Education, & procurement, Positioning  Safety Devices  Type of devices: Call light within reach, Chair alarm in place, Gait belt, Patient at risk for falls, Left in chair, Nurse notified    G-Code  PT G-Codes  Functional Assessment Tool Used: [place]   Score: 9/28  Functional Limitation: Mobility: Walking and moving around  Mobility: Walking and Moving Around Current Status ():  At least 60 percent but less than 80 percent impaired, limited or restricted  Mobility: Walking and Moving Around Goal Status ():  At least 1 percent but less than 20 percent impaired, limited or restricted    Goals  Short term goals  Time Frame for Short term goals: 12 visits  Short term goal 1: independent bed mobility  Short term goal 2: independent transfers  Short term goal 3: independent gait with rw x 30 x2  Short term goal 4: stair ambulation x 3 steps with 1 HR, min A+1   Patient Goals   Patient goals : pt didn't state goal; agreed that it would be nice to go home with her son once her heart is better       Therapy Time   Individual Concurrent Group Co-treatment   Time In 0813         Time Out 0912         Minutes 59                 Klarissa Moseley, PT

## 2017-11-05 NOTE — PLAN OF CARE
Problem: Falls - Risk of  Goal: Absence of falls  Outcome: Ongoing      Problem: Risk for Impaired Skin Integrity  Goal: Tissue integrity - skin and mucous membranes  Structural intactness and normal physiological function of skin and  mucous membranes.    Outcome: Ongoing      Comments: Electronically signed by David Jin RN on 11/5/2017 at 5:39 AM

## 2017-11-05 NOTE — PROGRESS NOTES
Eyes Q4H While awake    warfarin  5 mg Oral Once per day on Sun Mon Wed Sat    [START ON 11/7/2017] warfarin  2.5 mg Oral Once per day on Tue Thu Fri    warfarin (COUMADIN) daily dosing (placeholder)   Other RX Placeholder       Diagnostic Labs and Imaging    CBC: Recent Labs      11/04/17   1658   WBC  9.3   RBC  5.10   HGB  13.1   HCT  42.7   MCV  83.7   RDW  13.5   PLT  290     BMP: Recent Labs      11/04/17 1658   NA  134*   K  3.5*   CL  95*   CO2  21   PHOS  2.9   BUN  81*   CREATININE  2.41*     BNP: No results for input(s): BNP in the last 72 hours. PT/INR:   Recent Labs      11/04/17 1658   PROTIME  23.4*   INR  2.2     APTT: No results for input(s): APTT in the last 72 hours. CARDIAC ENZYMES: Recent Labs      11/04/17 1658   CKMB  49.5*   TROPONINI  0.06     FASTING LIPID PANEL:No results found for: CHOL, HDL, TRIG  LIVER PROFILE: Recent Labs      11/04/17 1658   AST  49*   ALT  19   BILIDIR  0.12   BILITOT  0.38   ALKPHOS  85        Assessment and Plan:   Principal Problem:    UMAIR (acute kidney injury) ([facility] [address]  Active Problems:    Dehydration    Renal artery stenosis (HCC)    CKD (chronic kidney disease) stage 3, GFR 30-59 ml/min    Breast cancer (HCC)    Alzheimer disease    Diarrhea    Fatigue    Paroxysmal tachycardia (HCC)    Hypothyroidism    Osteoporosis    A-fib (HCC)    Lactic acid acidosis    Conjunctivitis    Elevated CK       UMAIR on CKD likely secondary to dehydration   Hx of diarrhea and decreased oral intake    · Trend LA  · Repeat CK in the AM  · Monitor renal function and urine output  · Renal US  · Bladder scan   · Urine culture, UA, Urine sodium       Diarrhea  · Stool culture  · C.  Diff PCR  · Fecal lactoferrin   · Calprotectin stool      Elevated pro-BNP  · No known hx of CHF  · 2-D echo   · No sign of fluid overload   · IVF hydration at 50 ml/hr      Conjunctivitis of Right eye   · Ciprofloxacin eye drop       Hx of A-fib on coumadin   · Coumadin   · Monitor INR   · Pharmacy to manage Coumadin      Alzheimer disease   · Home medication: donepezil resumed      UA showing moderate amount of Hgb  FU urine culture      Diet: Renal Diet. GI prophylaxis. Pepcid 20 MG PO BID   PT/OT evaluation and treatment  . Discharge planning   Code status: DNR-CCA       I have discussed the care of Samuel Thompson  including pertinent history and exam findings with the resident. I have reviewed the key elements of all parts of the encounter with the resident. I have seen and examined the patient with the resident and the key elements of all parts of the encounter have been performed by me.     See H&P  Kirit Gonzalez MD   11/5/17     Allena Najjar, MD   PGY-1  Department of Internal Medicine  [facility]         11/5/2017, 6:51 AM

## 2017-11-05 NOTE — ED PROVIDER NOTES
changes, rashes    PHYSICAL EXAM   (up to 7 for level 4, 8 or more for level 5)      INITIAL VITALS:   /73   Pulse 78   Temp 98 °F (36.7 °C) (Oral)   Resp 17   Ht 5' 3\" (1.6 m)   Wt 124 lb 6.4 oz (56.4 kg)   SpO2 98%   BMI 22.04 kg/m²       Vital signs reviewed.  Nursing note reviewed    Constitutional: Well developed; well-nourished  HENT: Normocephalic, atraumatic   Eyes: CECILIA Conj nl Conjunctival irritation   Neck: ROM nl Supple  Cardiovascular: Mildly tachycardic, Regular Rhythm No murmurs, rubs, gallops  Pulmonary/Chest Wall: Effort normal limit, clear to ausculte bilaterally   Abdomen: Soft, non-tender, non-distended bowel sounds present no pulsatile mass  Musculoskeletal: Normal ROM, no edema  Neurological: Alert and Oriented x3, at her baseline   Skin: Slightly cool and dry cap refill 2-4 seconds   Psych: Mood/affect normal, behavior normal  Neuro: CN 2-12 intact, MS in all four extremities 5/5, Patellar and brachial reflexes 2/4, Negative finger to nose, pronator drift, heal to shin, finger to thumb, rapid alternating movements      DIFFERENTIAL  DIAGNOSIS     PLAN (LABS / IMAGING / EKG):  Orders Placed This Encounter   Procedures    Urine Culture    RAPID INFLUENZA A/B ANTIGENS    Culture Blood #1    Culture Blood #1    Culture Stool    C DIFF TOXIN B BY RT PCR    XR Chest Portable    US RENAL COMPLETE    CBC Auto Differential    Hepatic Function Panel    Basic Metabolic Panel    Urinalysis    Lipase    Lactic Acid, Whole Blood    Brain Natriuretic Peptide    Protime-INR    TSH with Reflex    Magnesium    Calcium, Ionized    Phosphorus    CK isoenzymes    Lactic Acid, Plasma    Urinalysis with microscopic    Creatinine, urine, random    Sodium, urine, random    Basic metabolic panel    Lactic acid, plasma    CBC auto differential    Calprotectin Stool    Fecal lactoferrin    PROTIME-INR    CK    DIET RENAL;    Vital signs per unit routine    Notify physician  Notify physician    Up with assistance    Place intermittent pneumatic compression device    Elevate heels off of bed at all times if patient is not able to move lower extremities    Turn or assist with turn every 2 hours if patient is unable to turn self. Remind patient to turn if necessary.     Inspect skin per unit guidelines    Maintain HOB at the lowest elevation consistent with medical plan of care    Vital signs per unit routine    Tobacco cessation education    Vital signs (specify frequency)    Orthostatic blood pressure    Telemetry monitoring    Bladder scan    Daily weights    Intake and output    DNR comfort care - arrest    Inpatient consult to Internal Medicine    Inpatient consult to Spiritual Services    Inpatient consult to Social Work   [address] [place] [address] to Dose: Warfarin    OT eval and treat    PT evaluation and treat    Initiate Oxygen Therapy Protocol    POCT troponin    POCT troponin    EKG 12 Lead    Echocardiogram 2D W M-Mode    Insert peripheral IV    PATIENT STATUS (FROM ED OR OR/PROCEDURAL) Inpatient    Transfer patient       MEDICATIONS ORDERED:  Orders Placed This Encounter   Medications    0.9 % sodium chloride bolus    0.9 % sodium chloride bolus    DISCONTD: 0.9 % sodium chloride infusion    sodium chloride flush 0.9 % injection 10 mL    sodium chloride flush 0.9 % injection 10 mL    acetaminophen (TYLENOL) tablet 650 mg    DISCONTD: ondansetron (ZOFRAN) tablet 4 mg    0.9 % sodium chloride infusion    aspirin chewable tablet 81 mg    donepezil (ARICEPT) tablet 10 mg    levothyroxine (SYNTHROID) tablet 75 mcg    DISCONTD: warfarin (COUMADIN) tablet 5 mg    acetaminophen (TYLENOL) tablet 650 mg    magnesium hydroxide (MILK OF MAGNESIA) 400 MG/5ML suspension 30 mL    ondansetron (ZOFRAN) injection 4 mg    DISCONTD: heparin (porcine) injection 5,000 Units    sodium chloride flush 0.9 % injection 10 mL    sodium chloride flush 0.9 % injection 10 mL    famotidine (PEPCID) tablet 20 mg    ciprofloxacin (CILOXAN) 0.3 % ophthalmic solution 1 drop    metoprolol tartrate (LOPRESSOR) tablet 12.5 mg    DISCONTD: warfarin (COUMADIN) daily dosing (placeholder)    metoprolol (LOPRESSOR) injection 5 mg    warfarin (COUMADIN) tablet 5 mg    warfarin (COUMADIN) tablet 2.5 mg    warfarin (COUMADIN) daily dosing (placeholder)       DDX: Weakness:  DDX: CVA, MS, Guillain [place], Transverse myelitis, Myasthenia gravis, cardiac, anemia, electrolytes, infection, change in medications, hypothyroid, rheumatalgic, depression, dehydration       DIAGNOSTIC RESULTS / [address] [place] [address] / [facility]     LABS:  Results for orders placed or performed during the hospital encounter of 11/04/17   RAPID INFLUENZA A/B ANTIGENS   Result Value Ref Range    Specimen Description . NASOPHARYNGEAL SWAB     Special Requests NOT REPORTED     Direct Exam PRESUMPTIVE NEGATIVE for Influenza A + B antigens. Direct Exam       PCR testing to confirm this result is available upon request.  Specimen will be    Direct Exam        saved in the laboratory for 7 days. Please call [phone] if PCR testing is    Direct Exam  indicated. Direct Exam       [address] [phone]    Status FINAL 11/04/2017    Culture Blood #1   Result Value Ref Range    Specimen Description . BLOOD     Special Requests RGT ANTE 4.5ML     Culture NO GROWTH 1 HOUR     Culture       [address] [phone]    Status Pending    Culture Blood #1   Result Value Ref Range    Specimen Description . BLOOD     Special Requests RT HAND 4ML     Culture NO GROWTH 1 HOUR     Culture       [address] [phone]    Status Pending    CBC Auto Differential   Result Value Ref Range    WBC 9.3 3.5 - 11.3 k/uL    RBC 5.10 3.95 - 5.11 m/uL    Hemoglobin 13.1 11.9 - 15.1 g/dL    Hematocrit 42.7 36.3 - 47.1 % MCV 83.7 82.6 - 102.9 fL    MCH 25.7 25.2 - 33.5 pg    MCHC 30.7 28.4 - 34.8 g/dL    RDW 13.5 11.8 - 14.4 %    Platelets 660 282 - 750 k/uL    MPV 10.8 8.1 - 13.5 fL    Differential Type NOT REPORTED     WBC Morphology NOT REPORTED     RBC Morphology NOT REPORTED     Platelet Estimate NOT REPORTED     Seg Neutrophils 82 (H) 36 - 65 %    Lymphocytes 9 (L) 24 - 43 %    Monocytes 7 3 - 12 %    Eosinophils % 1 1 - 4 %    Basophils 1 0 - 2 %    Immature Granulocytes 0 0 %    Segs Absolute 7.64 1.50 - 8.10 k/uL    Absolute Lymph # 0.87 (L) 1.10 - 3.70 k/uL    Absolute Mono # 0.61 0.10 - 1.20 k/uL    Absolute Eos # 0.07 0.00 - 0.44 k/uL    Basophils # 0.05 0.00 - 0.20 k/uL    Absolute Immature Granulocyte 0.03 0.00 - 0.30 k/uL   Hepatic Function Panel   Result Value Ref Range    Alb 3.1 (L) 3.5 - 5.2 g/dL    Alkaline Phosphatase 85 35 - 104 U/L    ALT 19 5 - 33 U/L    AST 49 (H) <32 U/L    Total Bilirubin 0.38 0.3 - 1.2 mg/dL    Bilirubin, Direct 0.12 <0.31 mg/dL    Bilirubin, Indirect 0.26 0.00 - 1.00 mg/dL    Total Protein 7.0 6.4 - 8.3 g/dL    Globulin NOT REPORTED 1.5 - 3.8 g/dL    Albumin/Globulin Ratio 0.8 (L) 1.0 - 2.5   Basic Metabolic Panel   Result Value Ref Range    Glucose 264 (H) 70 - 99 mg/dL    BUN 81 (H) 8 - 23 mg/dL    CREATININE 2.41 (H) 0.50 - 0.90 mg/dL    Bun/Cre Ratio NOT REPORTED 9 - 20    Calcium 9.0 8.6 - 10.4 mg/dL    Sodium 134 (L) 135 - 144 mmol/L    Potassium 3.5 (L) 3.7 - 5.3 mmol/L    Chloride 95 (L) 98 - 107 mmol/L    CO2 21 20 - 31 mmol/L    Anion Gap 18 (H) 9 - 17 mmol/L    GFR Non-African American 19 (L) >60 mL/min    GFR  23 (L) >60 mL/min    GFR Comment          GFR Staging NOT REPORTED    Lipase   Result Value Ref Range    Lipase 35 13 - 60 U/L   Lactic Acid, Whole Blood   Result Value Ref Range    Lactic Acid, Whole Blood 3.4 (H) 0.7 - 2.1 mmol/L   Brain Natriuretic Peptide   Result Value Ref Range    Pro-BNP 27,272 (H) <300 pg/mL    BNP Interpretation

## 2017-11-05 NOTE — PROGRESS NOTES
Patient arrived to floor with A-fib w/ RVR and HR in the 160s. Paged internal med, 5mg of Lopressor given. HR came down (); HR went back up to 160. 5mg of labetalol was given, BP decreased significantly. Two 250mL boluses have been given.

## 2017-11-05 NOTE — H&P
apical impulse, regular rate and rhythm, normal S1 and S2, no S3 or S4, and no murmur noted  ABDOMEN:  No scars, normal bowel sounds, soft, non-distended, non-tender, no masses palpated, no hepatosplenomegally  NEUROLOGIC:  Awake, alert, oriented to name, place and time. Cranial nerves II-XII are grossly intact. Motor is 5 out of 5 bilaterally. Sensory is intact. ASSESSMENT  Principal Problem:    UMAIR (acute kidney injury) ([address]  Active Problems:    Dehydration    Renal artery stenosis (HCC)    CKD (chronic kidney disease) stage 3, GFR 30-59 ml/min    Breast cancer (HCC)    Alzheimer disease    Diarrhea    Fatigue    Paroxysmal tachycardia (HCC)    Hypothyroidism    Osteoporosis    A-fib (HCC)    Lactic acid acidosis    Conjunctivitis            PLAN:      UMAIR on CKD likely secondary to dehydration   Hx of diarrhea and decreased oral intake    · Trend LA  · Repeat CK in the AM  · Monitor renal function and urine output  · Renal US  · Bladder scan   · Urine culture, UA, Urine sodium      Diarrhea  · Stool culture  · C. Diff PCR  · Fecal lactoferrin   · Calprotectin stool     Elevated pro-BNP  · No known hx of CHF  · 2-D echo   · No sign of fluid overload   · IVF hydration at 50 ml/hr     Conjunctivitis of Right eye   · Ciprofloxacin eye drop      Hx of A-fib on coumadin   · Coumadin   · Monitor INR   · Pharmacy to manage Coumadin     Alzheimer disease   · Home medication: donepezil resumed       Diet: Renal Diet. GI prophylaxis. Pepcid 20 MG PO BID   PT/OT evaluation and treatment  . Discharge planning   Code status: DNR-CCA       Dane Tariq MD   PGY-1  Department of Internal Medicine  [facility]         11/4/2017, 9:31 PM    Attending Physician Statement  I have discussed the care of Yoni Pepe, including pertinent history and exam findings with the resident. I have reviewed the key elements of all parts of the encounter with the resident.  I have

## 2017-11-05 NOTE — PROGRESS NOTES
Called report to Elina Sotelo RN. Patient will be transferred as soon as writer is available to transfer her.

## 2017-11-05 NOTE — CARE COORDINATION
Case Management Initial Discharge Plan  Leopoldo Milian,         Readmission Risk              Readmission Risk:        9.5       Age 72 or Greater:  1    Admitted from SNF or Requires Paid or Family Care:  0    Currently has CHF,COPD,ARF,CRI,or is on dialysis:  0    Takes more than 5 Prescription Medications:  4    Takes Digoxin,Insulin,Anticoagulants,Narcotics or ASA/Plavix:  [address] in Past 12 Months:  0    On Disability:  0    Patient Considers own Health:  2.5            Met with:patient & family to discuss discharge plans.    Information verified: address, contacts, phone number, , insurance Yes  PCP: Jose Castañeda MD  Date of last visit: 2 wks    Insurance Provider: [place] medicare    Discharge Planning  Current Residence:  Private Residence  Living Arrangements:  Children lives with son  Home has 2 stories/ lives on one level three steps to enter  Support Systems:  Children  Current Services PTA:  None   Patient able to perform ADL's:Assisted  DME used to aid ambulation prior to admission: none walks holding on to furniture refuses to use walker or cane    Potential Assistance Needed:  (possible placement/home care)    Pharmacy: [facility]   Potential Assistance Purchasing Medications:  No      Type of Home Care Services:  None  Patient expects to be discharged to:  home vs placement    Prior SNF/Rehab Placement and Facility: none  Agreeable to SNF/Rehab: Yes if recommended  Freedom of choice provided: yes   Evaluation: no    Expected Discharge date:  17    Per family patient hadn't got oob in 2 days, decreased appetite, fell off toilet 1 month ago     On coumadin     Discharge Plan: await pt/ot abhishek, family interested in snf, list provided         Electronically signed by Vin South RN on 17 at 6:01 PM

## 2017-11-05 NOTE — PROGRESS NOTES
Pharmacy Note  Warfarin Consult    Clay Mccormick is a 80 y.o. female for whom pharmacy has been consulted to manage warfarin therapy. Consulting Physician: Orlie Scheuermann  Reason for Admission: Fatigue    Warfarin dose prior to admission: 2.5 mg on Tues, Thurs and Fri; 5 mg on all other days. Warfarin indication: Atrial fibrillation  Target INR range: 2-3     Past Medical History:   Diagnosis Date    Atrial fibrillation (HCC)     Cancer ([facility]     breast    Dementia                 Recent Labs      11/04/17   1658   INR  2.2     Recent Labs      11/04/17   1658   HGB  13.1   HCT  42.7   PLT  290       Current warfarin drug-drug interactions: Levothyroxine and acetaminophen      Date             INR        Dose   11/4/2017            2.2       5 mg    Daily PT/INR while inpatient. Thank you for the consult. Will continue to follow.     Miles Chavez [facility]  11/4/2017 9:04 PM

## 2017-11-05 NOTE — PLAN OF CARE
Problem: Falls - Risk of  Goal: Absence of falls  Outcome: Ongoing  Call light with in reach, bed locked in lowest position with bed alarm on, family at bedside. Nurse rounds hourly and prn. Will continue to monitor.

## 2017-11-06 LAB
ANION GAP SERPL CALCULATED.3IONS-SCNC: 17 MMOL/L (ref 9–17)
BUN BLDV-MCNC: 42 MG/DL (ref 8–23)
BUN/CREAT BLD: ABNORMAL (ref 9–20)
CALCIUM SERPL-MCNC: 8.6 MG/DL (ref 8.6–10.4)
CHLORIDE BLD-SCNC: 104 MMOL/L (ref 98–107)
CO2: 19 MMOL/L (ref 20–31)
CREAT SERPL-MCNC: 1.47 MG/DL (ref 0.5–0.9)
EKG ATRIAL RATE: 64 BPM
EKG Q-T INTERVAL: 396 MS
EKG QRS DURATION: 134 MS
EKG QTC CALCULATION (BAZETT): 497 MS
EKG R AXIS: 78 DEGREES
EKG T AXIS: 59 DEGREES
EKG VENTRICULAR RATE: 95 BPM
GFR AFRICAN AMERICAN: 40 ML/MIN
GFR NON-AFRICAN AMERICAN: 33 ML/MIN
GFR SERPL CREATININE-BSD FRML MDRD: ABNORMAL ML/MIN/{1.73_M2}
GFR SERPL CREATININE-BSD FRML MDRD: ABNORMAL ML/MIN/{1.73_M2}
GLUCOSE BLD-MCNC: 108 MG/DL (ref 70–99)
INR BLD: 3.5
LACTIC ACID, WHOLE BLOOD: 2.1 MMOL/L (ref 0.7–2.1)
LV EF: 53 %
LVEF MODALITY: NORMAL
POTASSIUM SERPL-SCNC: 3.5 MMOL/L (ref 3.7–5.3)
PROTHROMBIN TIME: 37.7 SEC (ref 9.4–12.6)
SODIUM BLD-SCNC: 140 MMOL/L (ref 135–144)

## 2017-11-06 PROCEDURE — 83605 ASSAY OF LACTIC ACID: CPT

## 2017-11-06 PROCEDURE — 85610 PROTHROMBIN TIME: CPT

## 2017-11-06 PROCEDURE — 2500000003 HC RX 250 WO HCPCS: Performed by: HOSPITALIST

## 2017-11-06 PROCEDURE — 93306 TTE W/DOPPLER COMPLETE: CPT

## 2017-11-06 PROCEDURE — 6370000000 HC RX 637 (ALT 250 FOR IP): Performed by: HOSPITALIST

## 2017-11-06 PROCEDURE — 97535 SELF CARE MNGMENT TRAINING: CPT

## 2017-11-06 PROCEDURE — 6370000000 HC RX 637 (ALT 250 FOR IP): Performed by: STUDENT IN AN ORGANIZED HEALTH CARE EDUCATION/TRAINING PROGRAM

## 2017-11-06 PROCEDURE — G8988 SELF CARE GOAL STATUS: HCPCS

## 2017-11-06 PROCEDURE — 2580000003 HC RX 258: Performed by: HOSPITALIST

## 2017-11-06 PROCEDURE — 80048 BASIC METABOLIC PNL TOTAL CA: CPT

## 2017-11-06 PROCEDURE — 36415 COLL VENOUS BLD VENIPUNCTURE: CPT

## 2017-11-06 PROCEDURE — G8987 SELF CARE CURRENT STATUS: HCPCS

## 2017-11-06 PROCEDURE — 2580000003 HC RX 258: Performed by: INTERNAL MEDICINE

## 2017-11-06 PROCEDURE — 2060000000 HC ICU INTERMEDIATE R&B

## 2017-11-06 PROCEDURE — 97166 OT EVAL MOD COMPLEX 45 MIN: CPT

## 2017-11-06 RX ORDER — 0.9 % SODIUM CHLORIDE 0.9 %
250 INTRAVENOUS SOLUTION INTRAVENOUS ONCE
Status: COMPLETED | OUTPATIENT
Start: 2017-11-06 | End: 2017-11-06

## 2017-11-06 RX ORDER — FAMOTIDINE 20 MG/1
20 TABLET, FILM COATED ORAL DAILY
Status: DISCONTINUED | OUTPATIENT
Start: 2017-11-07 | End: 2017-11-09 | Stop reason: HOSPADM

## 2017-11-06 RX ADMIN — DONEPEZIL HYDROCHLORIDE 10 MG: 10 TABLET, FILM COATED ORAL at 07:47

## 2017-11-06 RX ADMIN — CIPROFLOXACIN HYDROCHLORIDE 1 DROP: 3.5 SOLUTION/ DROPS TOPICAL at 06:34

## 2017-11-06 RX ADMIN — SODIUM CHLORIDE: 9 INJECTION, SOLUTION INTRAVENOUS at 15:00

## 2017-11-06 RX ADMIN — CIPROFLOXACIN 250 MG: 250 TABLET, FILM COATED ORAL at 07:47

## 2017-11-06 RX ADMIN — CIPROFLOXACIN HYDROCHLORIDE 1 DROP: 3.5 SOLUTION/ DROPS TOPICAL at 21:16

## 2017-11-06 RX ADMIN — DONEPEZIL HYDROCHLORIDE 10 MG: 10 TABLET, FILM COATED ORAL at 21:16

## 2017-11-06 RX ADMIN — METOPROLOL TARTRATE 5 MG: 1 INJECTION, SOLUTION INTRAVENOUS at 23:40

## 2017-11-06 RX ADMIN — METOPROLOL TARTRATE 5 MG: 1 INJECTION, SOLUTION INTRAVENOUS at 01:47

## 2017-11-06 RX ADMIN — POTASSIUM CHLORIDE 40 MEQ: 20 TABLET, EXTENDED RELEASE ORAL at 10:33

## 2017-11-06 RX ADMIN — CIPROFLOXACIN 250 MG: 250 TABLET, FILM COATED ORAL at 21:16

## 2017-11-06 RX ADMIN — CIPROFLOXACIN 250 MG: 250 TABLET, FILM COATED ORAL at 01:58

## 2017-11-06 RX ADMIN — FAMOTIDINE 20 MG: 20 TABLET, FILM COATED ORAL at 07:47

## 2017-11-06 RX ADMIN — CIPROFLOXACIN HYDROCHLORIDE 1 DROP: 3.5 SOLUTION/ DROPS TOPICAL at 14:21

## 2017-11-06 RX ADMIN — LEVOTHYROXINE SODIUM 75 MCG: 75 TABLET ORAL at 07:47

## 2017-11-06 RX ADMIN — ASPIRIN 81 MG: 81 TABLET, CHEWABLE ORAL at 07:47

## 2017-11-06 RX ADMIN — SODIUM CHLORIDE: 9 INJECTION, SOLUTION INTRAVENOUS at 10:41

## 2017-11-06 ASSESSMENT — PAIN SCALES - GENERAL: PAINLEVEL_OUTOF10: 0

## 2017-11-06 NOTE — PROGRESS NOTES
Occupational Therapy   Occupational Therapy Initial Assessment  Date: 2017   Patient Name: Aaron Herrera  MRN: [id number]     : 10/10/1927     HISTORY OF PRESENT ILLNESS:       History was obtained from sibling, chart review and the patient.    Elías Maradiaga is a 80 y.o. with PMH of   - CKD stage III  - Renal artery stenosis s/p stent placement   - A fib on Coumadin   - Alzheimer disease   - Osteoporosis   - Hypothyroidism       Presented to ER with difficulty ambulating and fatigue. Pt's brother at bedside, reporting that pt has had loose stool for the past 3 days. She also had decrease oral intake. No associated fever, chills, nausea or vomiting. No hematochezia or melena. Pt has stress incontinence and wears briefs, no reported change in urinary frequency or dysuria.      Upon admission, pt was A&O, hypotensive (BP 98/58), tachycardic () and afebrile.      Admission labs showed Lactic of 3.5, elevated CK (2,571) CK-MB (49.5), elevated pro-BNP (27,272)  Pt denies any hx of diagnosed CHF.      Labs showed no leukocytosis,  AG 18, abnormal electrolites (Na 134, K 3.5 ), ab normal kidney function tests (BUN/ Crt 81/2.41), and normal LFT. Patient Diagnosis(es): The primary encounter diagnosis was Generalized weakness. Diagnoses of Dehydration, Fatigue, unspecified type, Non-traumatic rhabdomyolysis, and Hyperglycemia were also pertinent to this visit. has a past medical history of Atrial fibrillation ([facility] Cancer ([facility] and Dementia. has a past surgical history that includes Breast surgery. Restrictions  Restrictions/Precautions  Restrictions/Precautions: Cardiac, Fall Risk (orthostatic, monitor BP and HR)  Required Braces or Orthoses?: No  Position Activity Restriction  Other position/activity restrictions:  Up with assistance    Subjective   General  Patient assessed for rehabilitation services?: Yes  Family / Caregiver Present: No  Pain Assessment  Patient Currently in Pain: Denies  Pain Assessment: 0-10  Pain Level: 0  Oxygen Therapy  SpO2: 96 %  Pulse Oximeter Device Mode: Continuous  Pulse Oximeter Device Location: Finger  O2 Device: None (Room air)     Social/Functional History  Social/Functional History  Lives With: Son  Type of Home: House  Home Layout: Laundry in basement, Two level, Able to Live on Main level with bedroom/bathroom  Home Access: Stairs to enter with rails  Entrance Stairs - Number of Steps: 3 TANVI  Entrance Stairs - Rails: Both  Bathroom Shower/Tub: Tub/Shower unit  Bathroom Toilet: Standard  Bathroom Equipment:  (none )  Home Equipment: Rolling walker, Cane, Standard walker  Receives Help From: Family  ADL Assistance: Independent  Homemaking Assistance: Needs assistance  Homemaking Responsibilities: No (Pt reports son and grand/daughter assist with all home care activity )  Ambulation Assistance: Independent  Transfer Assistance: Independent  Active : No  Mode of Transportation: Family, Car  Occupation: Retired  Additional Comments: Pt reports furniture walking at home, does not use device in home     Objective   Vision: Within Functional Limits  Hearing: Exceptions to [facility]  Hearing Exceptions: Hard of hearing/hearing concerns    Orientation  Overall Orientation Status: Within Functional Limits  Observation/Palpation  Posture: Fair  Observation: kyphotic, verbal and physical cues to correct   Balance  Sitting Balance: Contact guard assistance  Standing Balance  Sit to stand: Minimal assistance  Stand to sit: Minimal assistance  Functional Mobility  Functional - Mobility Device: Rolling Walker  Activity: Other  Assist Level:  Moderate assistance  Functional Mobility Comments: Verbal cues for hand placement on RW during session  ADL  Feeding: Maximum assistance  Grooming: Contact guard assistance (to comb hair seated in bed)  UE Bathing: Minimal assistance;Verbal cueing (to wash back, pt able to wash chest and B UE seated in chair)  LE Bathing: Verbal cueing;Minimal assistance;Setup (to wash BLE seated in chair )  UE Dressing: Verbal cueing; Moderate assistance (to don/ doff gown)  LE Dressing: Maximum assistance (to don/doff socks)  Toileting: Maximum assistance (to complete pericare )  Additional Comments: Pt supine in bed on arrival, HR at 133, RN aware, administered meds. /42. Per RN, okay to get up to chair for ADL. Pt reported dizziness on first sitting EOB. Pt reports decreased dizziness following sitting EOB. Pt assisted to perform sit <> stand transfer and steps to chair. Pt required verbal and physical cues to complete safe transfers during session. Pt assisted to complete ADL activites as documented above. Pt assisted to return to chair. Pt required assist to complete sit <> stand transfer and verbal/ physical cues for hand and body placement on RW (noted to push walker far outside KRYSTYNA). Pt noted to demo frequent \"plop\" tech and decreased safety awareness. Pt assisted to return to bed, call light in reach and RN notified on therapist exit. PCA present to assist with feeding. Tone RUE  RUE Tone: Normotonic  Tone LUE  LUE Tone: Normotonic  Coordination  Movements Are Fluid And Coordinated: Yes     Bed mobility  Rolling to Left: Minimal assistance  Supine to Sit: Minimal assistance  Sit to Supine: Moderate assistance (assist with LE)  Scooting: Minimal assistance  Transfers  Stand Step Transfers: Moderate assistance  Sit to stand: Minimal assistance  Stand to sit: Minimal assistance  Transfer Comments: Verbal cues for hand placement on RW and body placement in RW     Cognition  Overall Cognitive Status: Exceptions  Following Commands: Follows one step commands with repetition; Follows one step commands with increased time  Problem Solving: Assistance required to generate solutions;Assistance required to correct errors made;Assistance required to identify errors made;Assistance required to implement solutions  Insights: Decreased awareness of

## 2017-11-06 NOTE — PROGRESS NOTES
Attempted to start new IV, only tried once related to poor access, patient can not have IV on left arm. Was unsuccessful.

## 2017-11-06 NOTE — PROGRESS NOTES
[address] [place] [address]   Department of Internal Medicine  Internal Medicine Residency Program  Daily Progress Note  ______________________________________________________________________    Patient: Amol Oglesby  YOB: 1927   MRN: [id number]    Acct: [de-identified]     Admit date: 11/4/2017  Today's date: 11/06/17    Admitting Diagnosis: UMAIR (acute kidney injury) ([facility] [address]    Subjective:   Pt seen and Chart reviewed. Was in afib all night, in and out of rate control. Very weak even moving from bed to chair  Getting echo this AM  PO intake still not adequate although there is some improvement  Cr improved from 2.4 on admission to 1.47 today.  Renal US neg for hydronephrosis or signs of CKD  No diarrhea since admission      Objective:   Vital Sign:  BP (!) 99/52   Pulse 101   Temp 97.9 °F (36.6 °C) (Oral)   Resp 16   Ht 5' 3\" (1.6 m)   Wt 136 lb 7.4 oz (61.9 kg)   SpO2 98%   BMI 24.17 kg/m²       Physical Exam:  General appearance:  Very frail, in no distress  Mental Status: alert, oriented to person, place, and time  Neurologic:  alert, oriented, normal speech, no focal findings or movement disorder noted  Lungs:  clear to auscultation, no wheezes, rales or rhonchi, symmetric air entry  Heart[de-identified] normal rate, regular rhythm, normal S1, S2, no murmurs, rubs, clicks or gallops  Abdomen:  soft, nontender, nondistended, no masses or organomegaly  Extremities: peripheral pulses normal, no pedal edema, no clubbing or cyanosis   Skin: normal coloration and turgor, no rashes, no suspicious skin lesions noted    Medications:  Scheduled Medications   ciprofloxacin  250 mg Oral 2 times per day    sodium chloride flush  10 mL Intravenous 2 times per day    aspirin  81 mg Oral Daily    donepezil  10 mg Oral BID    levothyroxine  75 mcg Oral Daily    sodium chloride flush  10 mL Intravenous 2 times per day    famotidine  20 mg Oral BID    ciprofloxacin  1 drop Both Eyes Q4H While awake   

## 2017-11-06 NOTE — PROGRESS NOTES
Pharmacy Note  Warfarin Consult follow-up      Recent Labs      11/06/17   0714   INR  3.5     Recent Labs      11/04/17   1658  11/05/17   0642   HGB  13.1  11.1*   HCT  42.7  36.3   PLT  290  259       Significant Drug-Drug Interactions:  New warfarin drug-drug interactions: none  Discontinued drug-drug interactions: none  ignificant Drug-Drug Interactions:  New warfarin drug-drug interactions: Cipro  Discontinued drug-drug interactions: N/A     Date           INR                Dose  11/4/17       2.2                 5 mg  11/5/17       2.6                  1 mg  11/6/17       3.5                 hold    Notes:      Elevated INR today due to interaction with Cipro. Plan to hold warfarin today. Daily PT/INR while inpatient. [address] [place] [address]  Ph., CACP, Clinical Pharmacist  Anticoagulation Services, [facility]  11/6/2017  7:46 AM

## 2017-11-06 NOTE — FLOWSHEET NOTE
DATE: 2017    NAME: Callie Mendoza  MRN: [id number]   : 10/10/1927    Patient not seen this date for Physical Therapy due to:  [] Blood transfusion in progress  [] Hemodialysis  []  Patient Declined  [] Spine Precautions   [] Strict Bedrest  [] Surgery/ Procedure  [] Testing      [x] Other: A-fib/tachy; HR up to 150bpm at rest; will check back         [] PT being discontinued at this time. Patient independent. No further needs. [] PT being discontinued at this time as the patient has been transferred to palliative care. No further needs.     Martha Bhatt, PTA

## 2017-11-06 NOTE — CARE COORDINATION
Call placed to [facility], admissions states they are still reviewing info and will call when they know if they can accept pt.

## 2017-11-06 NOTE — PROGRESS NOTES
[facility] [address]  Occupational Therapy Not Seen Note    Patient not available for Occupational Therapy due to:    [] Testing:    [] Hemodialysis    [] Blood Transfusion in Progress    [x]Refusal by Patient: Assisted to eat breakfast, would like return attempt later this AM    [] Surgery/Procedure:    [] Strict Bedrest    [] Sedation    [] Spine Precautions     [] Pt being transferred to palliative care at this time. Spoke with pt/family and OT services to be defered. [] Pt independent with functional mobility and functional tasks.  Pt with no OT acute care needs at this time, will defer OT eval.    [] Other    Next Scheduled Treatment: Re-check as able 11/6 or 11/7/2017    Signature: HERMINIO Waldrop/ELVIRA

## 2017-11-06 NOTE — PROGRESS NOTES
Patient became woke up and her heart rate went to 192. Lopressor IV (5mg) given. Had patient bear down. Patient's heart rate currently 122. Continue to monitor.

## 2017-11-06 NOTE — CARE COORDINATION
INformed by nurse, pt and family have chosen [facility] as first choice for SNF, and [facility] as second choice.   Faxed referral to [address] [place] [address]

## 2017-11-06 NOTE — PROGRESS NOTES
Pharmacy Note     Renal Dose Adjustment    Selena Burgos is a 80 y.o. female. Pharmacist assessment of renally cleared medications. Recent Labs      11/05/17 0642 11/06/17 0714   BUN  63*  42*       Recent Labs      11/05/17 0642  11/06/17 0714   CREATININE  2.02*  1.47*       Estimated Creatinine Clearance: 21 mL/min (based on SCr of 1.47 mg/dL).     Height:   Ht Readings from Last 1 Encounters:   11/05/17 5' 3\" (1.6 m)     Weight:  Wt Readings from Last 1 Encounters:   11/06/17 136 lb 7.4 oz (61.9 kg)       The following medication dose has been adjusted based upon renal function per P&T Guidelines:             Famotidine reduced from 20 mg bid to 20 mg daily    Edward Kelly PharmD, BCPS 11/6/2017 3:41 PM

## 2017-11-07 LAB
ANION GAP SERPL CALCULATED.3IONS-SCNC: 13 MMOL/L (ref 9–17)
BUN BLDV-MCNC: 36 MG/DL (ref 8–23)
BUN/CREAT BLD: ABNORMAL (ref 9–20)
CALCIUM SERPL-MCNC: 8.8 MG/DL (ref 8.6–10.4)
CHLORIDE BLD-SCNC: 106 MMOL/L (ref 98–107)
CO2: 18 MMOL/L (ref 20–31)
CREAT SERPL-MCNC: 1.38 MG/DL (ref 0.5–0.9)
CULTURE: ABNORMAL
CULTURE: ABNORMAL
GFR AFRICAN AMERICAN: 44 ML/MIN
GFR NON-AFRICAN AMERICAN: 36 ML/MIN
GFR SERPL CREATININE-BSD FRML MDRD: ABNORMAL ML/MIN/{1.73_M2}
GFR SERPL CREATININE-BSD FRML MDRD: ABNORMAL ML/MIN/{1.73_M2}
GLUCOSE BLD-MCNC: 100 MG/DL (ref 70–99)
INR BLD: 2.5
Lab: ABNORMAL
ORGANISM: ABNORMAL
POTASSIUM SERPL-SCNC: 4.4 MMOL/L (ref 3.7–5.3)
PROTHROMBIN TIME: 26.7 SEC (ref 9.4–12.6)
SODIUM BLD-SCNC: 137 MMOL/L (ref 135–144)
SPECIMEN DESCRIPTION: ABNORMAL
STATUS: ABNORMAL

## 2017-11-07 PROCEDURE — 36415 COLL VENOUS BLD VENIPUNCTURE: CPT

## 2017-11-07 PROCEDURE — 97110 THERAPEUTIC EXERCISES: CPT

## 2017-11-07 PROCEDURE — 97530 THERAPEUTIC ACTIVITIES: CPT

## 2017-11-07 PROCEDURE — 80048 BASIC METABOLIC PNL TOTAL CA: CPT

## 2017-11-07 PROCEDURE — 6370000000 HC RX 637 (ALT 250 FOR IP): Performed by: HOSPITALIST

## 2017-11-07 PROCEDURE — 6370000000 HC RX 637 (ALT 250 FOR IP): Performed by: STUDENT IN AN ORGANIZED HEALTH CARE EDUCATION/TRAINING PROGRAM

## 2017-11-07 PROCEDURE — 2060000000 HC ICU INTERMEDIATE R&B

## 2017-11-07 PROCEDURE — 85610 PROTHROMBIN TIME: CPT

## 2017-11-07 PROCEDURE — 97116 GAIT TRAINING THERAPY: CPT

## 2017-11-07 PROCEDURE — 6370000000 HC RX 637 (ALT 250 FOR IP): Performed by: INTERNAL MEDICINE

## 2017-11-07 RX ORDER — CIPROFLOXACIN 250 MG/1
250 TABLET, FILM COATED ORAL EVERY 12 HOURS SCHEDULED
Qty: 4 TABLET | Refills: 0 | Status: CANCELLED | OUTPATIENT
Start: 2017-11-07 | End: 2017-11-09

## 2017-11-07 RX ORDER — WARFARIN SODIUM 2.5 MG/1
2.5 TABLET ORAL
Status: COMPLETED | OUTPATIENT
Start: 2017-11-07 | End: 2017-11-07

## 2017-11-07 RX ADMIN — ASPIRIN 81 MG: 81 TABLET, CHEWABLE ORAL at 08:48

## 2017-11-07 RX ADMIN — CIPROFLOXACIN HYDROCHLORIDE 1 DROP: 3.5 SOLUTION/ DROPS TOPICAL at 08:49

## 2017-11-07 RX ADMIN — DONEPEZIL HYDROCHLORIDE 10 MG: 10 TABLET, FILM COATED ORAL at 20:42

## 2017-11-07 RX ADMIN — WARFARIN SODIUM 2.5 MG: 2.5 TABLET ORAL at 17:12

## 2017-11-07 RX ADMIN — CIPROFLOXACIN HYDROCHLORIDE 1 DROP: 3.5 SOLUTION/ DROPS TOPICAL at 17:11

## 2017-11-07 RX ADMIN — FAMOTIDINE 20 MG: 20 TABLET, FILM COATED ORAL at 08:49

## 2017-11-07 RX ADMIN — DONEPEZIL HYDROCHLORIDE 10 MG: 10 TABLET, FILM COATED ORAL at 08:49

## 2017-11-07 RX ADMIN — METOPROLOL TARTRATE 12.5 MG: 25 TABLET ORAL at 08:48

## 2017-11-07 RX ADMIN — CIPROFLOXACIN 250 MG: 250 TABLET, FILM COATED ORAL at 08:48

## 2017-11-07 RX ADMIN — CIPROFLOXACIN HYDROCHLORIDE 1 DROP: 3.5 SOLUTION/ DROPS TOPICAL at 13:44

## 2017-11-07 RX ADMIN — METOPROLOL TARTRATE 12.5 MG: 25 TABLET ORAL at 13:43

## 2017-11-07 RX ADMIN — CIPROFLOXACIN 250 MG: 250 TABLET, FILM COATED ORAL at 20:42

## 2017-11-07 RX ADMIN — LEVOTHYROXINE SODIUM 75 MCG: 75 TABLET ORAL at 08:49

## 2017-11-07 ASSESSMENT — PAIN SCALES - GENERAL
PAINLEVEL_OUTOF10: 0
PAINLEVEL_OUTOF10: 1

## 2017-11-07 ASSESSMENT — PAIN DESCRIPTION - FREQUENCY: FREQUENCY: INTERMITTENT

## 2017-11-07 ASSESSMENT — PAIN DESCRIPTION - PAIN TYPE: TYPE: ACUTE PAIN

## 2017-11-07 ASSESSMENT — PAIN DESCRIPTION - LOCATION: LOCATION: NECK

## 2017-11-07 NOTE — PROGRESS NOTES
Pharmacy Note  Warfarin Consult follow-up      Recent Labs      11/07/17   0711   INR  2.5     Recent Labs      11/04/17   1658  11/05/17   0642   HGB  13.1  11.1*   HCT  42.7  36.3   PLT  290  259       Significant Drug-Drug Interactions:  New warfarin drug-drug interactions: none  Discontinued drug-drug interactions: none  Current warfarin drug-drug interactions: Cipro, aspirin, levothyroxine. Date           INR                Dose  11/4/17       2.2                 5 mg  11/5/17       2.6                  1 mg  11/6/17       3.5                 hold  11/7/17       2.5                 2.5 mg    Notes:       Now that inr is therapeutic, will restart warfarin. Daily PT/INR while inpatient. [address] [place] [address]  Ph., CACP, Clinical Pharmacist  Anticoagulation Services, [facility]  11/7/2017  7:57 AM

## 2017-11-07 NOTE — PROGRESS NOTES
Physical Therapy  Facility/Department: [facility]  Daily Treatment Note  NAME: Erlinda Knowles  : 10/10/1927  MRN: [id number]    Date of Service: 2017    Patient Diagnosis(es):   Patient Active Problem List    Diagnosis Date Noted    U[facility]IR (acute kidney [facility]) ([facility] 2017    Dehydration 2017    Renal artery stenosis ([facility] 2017    CKD (chronic kidney disease) stage 3, GFR 30-59 ml/min 2017    Breast cancer ([facility] 2017    Alzheimer disease 2017    Diarrhea 2017    Fatigue 2017    Paroxysmal tachycardia (HCC) 2017    Hypothyroidism 2017    Osteoporosis 2017    A-fib ([facility] 2017    Lactic acid acidosis 2017    Conjunctivitis 2017    Elevated CK 2017       Past Medical History:   Diagnosis Date    Atrial fibrillation ([facility]     Cancer (HCC)     breast    Dementia      Past Surgical History:   Procedure Laterality Date    BREAST SURGERY      left mastectomy       Restrictions  Restrictions/Precautions  Restrictions/Precautions: Cardiac, Fall Risk (orthostatic, monitor BP and HR)  Required Braces or Orthoses?: No  Position Activity Restriction  Other position/activity restrictions: Up with assistance  Subjective    Pt sitting up in bed upon entry. Pt has several family members present. Pt is still in Afib. Pt's HR ranging from low 90's to 120 BPM.  Pt has no c/o pain   Orientation    Overall Orientation Status: Impaired  Orientation Level: Oriented to place;Oriented to person;Disoriented to time;Disoriented to situation  Objective     Bed mobility  Rolling to Left: Minimal assistance  Supine to Sit: Moderate assistance  Scooting: Mod. assistance  Transfers  Sit to Stand: Moderate Assistance  Stand to sit: Moderate Assistance  Bed to Chair: Moderate to Maximum assistance+1  Comment: Verbal cues for hand placement with all transfers.   Ambulation  Ambulation?: Yes  Ambulation 1  Surface: level tile  Device: Rolling Gait Training, Stair training, Cognitive Reorientation, Home Exercise Program, Safety Education & Training, Patient/Caregiver Education & Training, Equipment Evaluation, Education, & procurement, Positioning  Safety Devices  Type of devices: Call light within reach, Chair alarm in place, Gait belt, Patient at risk for falls, Left in alarmed chair, Nurse notified     Therapy Time   Individual Concurrent Group Co-treatment   Time In  0300         Time Out  0340         Minutes  [address]

## 2017-11-07 NOTE — PROGRESS NOTES
[address] [place] [address]     Department of Internal Medicine - Staff Internal Medicine Service     DAILY PROGRESS NOTE  TEAM       Patient:  Horacio Pineda  YOB: 1927  MRN: [id number]     Acct: [de-identified]     Admit date: 11/4/2017  Admitting Diagnosis: UMAIR (acute kidney injury) ([facility] [address]    Subjective:   Patient seen and evaluated at bedside  No acute overnight issues. Patient appears more alert this morning  Echo result was normal.  Patient was started on IV fluid hydration at 75 mL/hour  This morning patient states that her appetite has been improving and she was able to eat her breakfast  Patient is tachycardiac & heart rate between 151-120. Systolic blood pressure 085. She was started on Lopressor 12.5 mg daily yesterday. Will continue to monitor the heart rate.     Objective:   /70   Pulse 93   Temp 97.6 °F (36.4 °C) (Oral)   Resp 18   Ht 5' 3\" (1.6 m)   Wt 149 lb 7.6 oz (67.8 kg)   SpO2 96%   BMI 26.48 kg/m²     General appearance - alert, well appearing, and in no distress  Mental status - alert, oriented to person, place, and time  Mouth - mucous membranes moist, pharynx normal without lesions  Chest - clear to auscultation, no wheezes, rales or rhonchi, symmetric air entry  Heart - normal rate, regular rhythm, normal S1, S2, no murmurs, rubs, clicks or gallops  Abdomen - soft, nontender, nondistended, no masses or organomegaly  Extremities - peripheral pulses normal, no pedal edema, no clubbing or cyanosis      Intake/Output Summary (Last 24 hours) at 11/07/17 0935  Last data filed at 11/07/17 0544   Gross per 24 hour   Intake             2029 ml   Output              675 ml   Net             1354 ml         Medications:      warfarin  2.5 mg Oral Once    metoprolol tartrate  12.5 mg Oral Daily    famotidine  20 mg Oral Daily    ciprofloxacin  250 mg Oral 2 times per day    sodium chloride flush  10 mL Intravenous 2 times per day    aspirin  81 mg Oral Daily  donepezil  10 mg Oral BID    levothyroxine  75 mcg Oral Daily    ciprofloxacin  1 drop Both Eyes Q4H While awake    warfarin (COUMADIN) daily dosing (placeholder)   Other RX Placeholder       Diagnostic Labs and Imaging    CBC:   Recent Labs      11/04/17 1658 11/05/17   0642   WBC  9.3  8.2   RBC  5.10  4.32   HGB  13.1  11.1*   HCT  42.7  36.3   MCV  83.7  84.0   RDW  13.5  13.7   PLT  290  259     BMP:   Recent Labs      11/04/17 1658 11/05/17 0642  11/06/17   0714   NA  134*  137  140   K  3.5*  3.6*  3.5*   CL  95*  103  104   CO2  21  20  19*   PHOS  2.9   --    --    BUN  81*  63*  42*   CREATININE  2.41*  2.02*  1.47*     BNP: No results for input(s): BNP in the last 72 hours. PT/INR:   Recent Labs      11/05/17 0642 11/06/17   0714  11/07/17   0711   PROTIME  28.5*  37.7*  26.7*   INR  2.6  3.5  2.5     APTT: No results for input(s): APTT in the last 72 hours. CARDIAC ENZYMES:   Recent Labs      11/04/17 1658   CKMB  49.5*   TROPONINI  0.06     FASTING LIPID PANEL:No results found for: CHOL, HDL, TRIG  LIVER PROFILE:   Recent Labs      11/04/17 1658   AST  49*   ALT  19   BILIDIR  0.12   BILITOT  0.38   ALKPHOS  85        Assessment and Plan:   Principal Problem:    UMAIR (acute kidney injury) ([facility]  Active Problems:    Dehydration    Renal artery stenosis (HCC)    CKD (chronic kidney disease) stage 3, GFR 30-59 ml/min    Breast cancer (HCC)    Alzheimer disease    Diarrhea    Fatigue    Paroxysmal tachycardia (HCC)    Hypothyroidism    Osteoporosis    A-fib (HCC)    Lactic acid acidosis    Conjunctivitis    Elevated CK       UMAIR on CKD likely secondary to dehydration   Hx of diarrhea and decreased oral intake      · Monitor renal function and urine output  · Renal US: Cystic lesion in the upper pole of the right kidney. Solid Lesion Lower Pole of the Left Kidney. · Creatinine improving  · Monitor heart rate.   On Lopressor 12.5 MG     UTI in the setting of UMAIR     · Urine culture: Positive for group D enterococcus. Patient is currently on Cipro 250 twice a day    Diarrhea- improved  · Stool culture  · C. Diff PCR  · Fecal lactoferrin   · Calprotectin stool      Elevated pro-BNP  · No known hx of CHF  · 2-D echo : Normal  · No sign of fluid overload. · IVF hydration at 75 ml/hr      Conjunctivitis of Right eye - improving  · Ciprofloxacin eye drop       Hx of A-fib on coumadin   · Coumadin   · Monitor INR   · Pharmacy to manage Coumadin   · Lopressor 12.5 mg      Alzheimer disease   · Home medication: donepezil resumed         Diet: Renal Diet. GI prophylaxis. Pepcid 20 MG PO BID   PT/OT evaluation and treatment  . Discharge planning   Code status: DNR-CCA        Sally Trinidad MD   PGY-1  Department of Internal Medicine  [facility]         11/7/2017, 9:25 AM     Attending Physician Statement For Internal Medicine  I have discussed the care of Cailin Gallego, including pertinent history and exam findings,  with the Resident. I have seen and examined the patient with the resident and reviewed the key elements of all parts of the encounter have been performed by me. I agree with the assessment, plan and orders as documented by the resident.     Dino Gonzales MD, MRCP Nayely Arita, FACP   11/7/2017 12:17 PM  Internal Medicine and Nephrology

## 2017-11-08 LAB
ANION GAP SERPL CALCULATED.3IONS-SCNC: 14 MMOL/L (ref 9–17)
BUN BLDV-MCNC: 32 MG/DL (ref 8–23)
BUN/CREAT BLD: ABNORMAL (ref 9–20)
CALCIUM SERPL-MCNC: 8.5 MG/DL (ref 8.6–10.4)
CHLORIDE BLD-SCNC: 110 MMOL/L (ref 98–107)
CO2: 20 MMOL/L (ref 20–31)
CREAT SERPL-MCNC: 1.2 MG/DL (ref 0.5–0.9)
GFR AFRICAN AMERICAN: 51 ML/MIN
GFR NON-AFRICAN AMERICAN: 42 ML/MIN
GFR SERPL CREATININE-BSD FRML MDRD: ABNORMAL ML/MIN/{1.73_M2}
GFR SERPL CREATININE-BSD FRML MDRD: ABNORMAL ML/MIN/{1.73_M2}
GLUCOSE BLD-MCNC: 101 MG/DL (ref 70–99)
INR BLD: 2.2
POTASSIUM SERPL-SCNC: 4.1 MMOL/L (ref 3.7–5.3)
PROTHROMBIN TIME: 24.1 SEC (ref 9.4–12.6)
SODIUM BLD-SCNC: 144 MMOL/L (ref 135–144)

## 2017-11-08 PROCEDURE — 97535 SELF CARE MNGMENT TRAINING: CPT

## 2017-11-08 PROCEDURE — 97116 GAIT TRAINING THERAPY: CPT

## 2017-11-08 PROCEDURE — 2060000000 HC ICU INTERMEDIATE R&B

## 2017-11-08 PROCEDURE — 2580000003 HC RX 258: Performed by: INTERNAL MEDICINE

## 2017-11-08 PROCEDURE — 6370000000 HC RX 637 (ALT 250 FOR IP): Performed by: INTERNAL MEDICINE

## 2017-11-08 PROCEDURE — 6370000000 HC RX 637 (ALT 250 FOR IP): Performed by: STUDENT IN AN ORGANIZED HEALTH CARE EDUCATION/TRAINING PROGRAM

## 2017-11-08 PROCEDURE — 80048 BASIC METABOLIC PNL TOTAL CA: CPT

## 2017-11-08 PROCEDURE — 6370000000 HC RX 637 (ALT 250 FOR IP): Performed by: HOSPITALIST

## 2017-11-08 PROCEDURE — 97530 THERAPEUTIC ACTIVITIES: CPT

## 2017-11-08 PROCEDURE — 36415 COLL VENOUS BLD VENIPUNCTURE: CPT

## 2017-11-08 PROCEDURE — 2580000003 HC RX 258: Performed by: EMERGENCY MEDICINE

## 2017-11-08 PROCEDURE — 85610 PROTHROMBIN TIME: CPT

## 2017-11-08 RX ORDER — WARFARIN SODIUM 2 MG/1
4 TABLET ORAL
Status: COMPLETED | OUTPATIENT
Start: 2017-11-08 | End: 2017-11-08

## 2017-11-08 RX ADMIN — SODIUM CHLORIDE: 9 INJECTION, SOLUTION INTRAVENOUS at 05:03

## 2017-11-08 RX ADMIN — DONEPEZIL HYDROCHLORIDE 10 MG: 10 TABLET, FILM COATED ORAL at 21:22

## 2017-11-08 RX ADMIN — LEVOTHYROXINE SODIUM 75 MCG: 75 TABLET ORAL at 08:30

## 2017-11-08 RX ADMIN — ASPIRIN 81 MG: 81 TABLET, CHEWABLE ORAL at 08:31

## 2017-11-08 RX ADMIN — FAMOTIDINE 20 MG: 20 TABLET, FILM COATED ORAL at 08:30

## 2017-11-08 RX ADMIN — CIPROFLOXACIN HYDROCHLORIDE 1 DROP: 3.5 SOLUTION/ DROPS TOPICAL at 08:27

## 2017-11-08 RX ADMIN — CIPROFLOXACIN HYDROCHLORIDE 1 DROP: 3.5 SOLUTION/ DROPS TOPICAL at 17:11

## 2017-11-08 RX ADMIN — DONEPEZIL HYDROCHLORIDE 10 MG: 10 TABLET, FILM COATED ORAL at 08:30

## 2017-11-08 RX ADMIN — CIPROFLOXACIN 250 MG: 250 TABLET, FILM COATED ORAL at 08:31

## 2017-11-08 RX ADMIN — CIPROFLOXACIN HYDROCHLORIDE 1 DROP: 3.5 SOLUTION/ DROPS TOPICAL at 21:22

## 2017-11-08 RX ADMIN — CIPROFLOXACIN 250 MG: 250 TABLET, FILM COATED ORAL at 21:22

## 2017-11-08 RX ADMIN — Medication 10 ML: at 08:32

## 2017-11-08 RX ADMIN — Medication 10 ML: at 21:22

## 2017-11-08 RX ADMIN — CIPROFLOXACIN HYDROCHLORIDE 1 DROP: 3.5 SOLUTION/ DROPS TOPICAL at 05:04

## 2017-11-08 RX ADMIN — METOPROLOL TARTRATE 25 MG: 25 TABLET ORAL at 08:30

## 2017-11-08 RX ADMIN — WARFARIN SODIUM 4 MG: 2 TABLET ORAL at 21:22

## 2017-11-08 ASSESSMENT — PAIN SCALES - GENERAL
PAINLEVEL_OUTOF10: 0

## 2017-11-08 NOTE — PROGRESS NOTES
Dynamic: Poor   Assessment     Body structures, Functions, Activity limitations: Decreased functional mobility ; Decreased strength;Decreased safe awareness;Decreased cognition;Decreased endurance;Decreased balance. Pt requires Mod. A x 1 for bed mobility,Mod. A x 1 for bed to chair transfers and Mod. A x 1 for ambulation with a RW. Pt would benefit going to a [facility] to increase her strength,endurance and balance. Prognosis: Good  Decision Making: Medium Complexity  Patient Education: importance of mobility within heart limitations.   Barriers to Learning: dementia  REQUIRES PT FOLLOW UP: Yes  Activity Tolerance  Activity Tolerance: Treatment limited secondary to medical complications (free text)   PT Equipment Recommendations  Equipment Needed:  (TBD)           Discharge Recommendations:  Subacute/Skilled Nursing Facility    G-Code     OutComes Score   Goals  Short term goals  Time Frame for Short term goals: 12 visits  Short term goal 1: independent bed mobility  Short term goal 2: independent transfers  Short term goal 3: independent gait with rw x 30 x2  Short term goal 4: stair ambulation x 3 steps with 1 HR, min A+1   Patient Goals   Patient goals : pt didn't state goal; agreed that it would be nice to go home with her son once her heart is better    Plan    Plan  Times per week: 6 visits weekly  Times per day: Daily  Current Treatment Recommendations: Strengthening, ROM, Balance Training, Functional Mobility Training, Transfer Training, Cognitive/Perceptual Training, Endurance Training, Gait Training, Stair training, Cognitive Reorientation, Home Exercise Program, Safety Education & Training, Patient/Caregiver Education & Training, Equipment Evaluation, Education, & procurement, Positioning  Safety Devices  Type of devices: Call light within reach, Chair alarm in place, Gait belt, Patient at risk for falls, Left in bed, Nurse notified     Therapy Time   Individual Concurrent Group Co-treatment Time In   [address] [place] [address] Road         Time Out   1115         Minutes   [address] [facility] [place]

## 2017-11-08 NOTE — PROGRESS NOTES
Occupational Therapy  Facility/Department: [facility] 3  Daily Treatment Note  NAME: Beto Polanco  : 10/10/1927  MRN: [id number]    Date of Service: 2017    Patient Diagnosis(es): The primary encounter diagnosis was Generalized weakness. Diagnoses of Dehydration, Fatigue, unspecified type, Non-traumatic rhabdomyolysis, and Hyperglycemia were also pertinent to this visit. has a past medical history of Atrial fibrillation ([facility] [address] Cancer ([facility] [address] and Dementia. has a past surgical history that includes Breast surgery. Restrictions  Restrictions/Precautions  Restrictions/Precautions: Cardiac, Fall Risk   Required Braces or Orthoses?: No  Position Activity Restriction  Other position/activity restrictions: Up with assistance  Subjective   General  Patient assessed for rehabilitation services?: Yes  Family / Caregiver Present: No  Pain Assessment  Patient Currently in Pain: Denies  Response to Pain Intervention: Patient Satisfied   Objective    ADL  Feeding: Setup; Increased time to complete  Grooming: Stand by assistance;Setup; Increased time to complete (seated in chair at tray table)  Balance  Sitting Balance: Stand by assistance (seated in chair)  Standing Balance: Unable to assess(comment)      Assessment   Performance deficits / Impairments: Decreased functional mobility ; Decreased ADL status; Decreased strength;Decreased safe awareness;Decreased cognition;Decreased endurance;Decreased balance;Decreased high-level IADLs  Assessment: Pt would benefit from SNF placement to address moderate deficits in endurance and safety awareness. Pt additionally has minimal deficits in ADL activites which would benefit from continued therapy following discharge prior to return home.    Prognosis: Fair  Patient Education: OT POC, discharge planning, proper pursed lipped breathing  Discharge Recommendations: Subacute/Skilled Nursing Facility  REQUIRES OT FOLLOW UP: Yes  Activity Tolerance  Activity Tolerance: Patient Tolerated treatment well  Safety Devices  Safety Devices in place: Yes  Type of devices: Call light within reach; Chair alarm in place;Gait belt;Patient at risk for falls; Left in chair;Nurse notified       Discharge Recommendations:  [facility]  Times per week: 3-4x/wk   Current Treatment Recommendations: Endurance Training, Safety Education & Training, Self-Care / ADL, Patient/Caregiver Education & Training, Equipment Evaluation, Education, & procurement, Functional Mobility Training    Goals  Short term goals  Time Frame for Short term goals: by discharge, pt will  Short term goal 1: demo I in UE ADL activites   Short term goal 2: demo MI/ I in LE ADL activites  Short term goal 3: demo understanding and use of safe transfer tech during functional activites with LRD with min verbal cues  Short term goal 4: demo understanding and use of EC/WS, fall prevention and proper pursed lipped breathing tech during fucntional activites with min verbal cues. Short term goal 5: demo increased standing tolerance of 10+ min to assist with ADL/ functional activites      Therapy Time   Individual Concurrent Group Co-treatment   Time In  0950         Time Out  1015         Minutes                 Pt up in chair upon arrival. Set up at tray table for grooming task and completed with SBA. Breakfast tray arrived and set up, pt feeding self without difficulty. Pt remained up in chair, will check back later this date for completion of self care. Call light and phone in reach.      [address]

## 2017-11-08 NOTE — PLAN OF CARE
Problem: Falls - Risk of  Goal: Absence of falls  Outcome: Met This Shift  Patient remained free of falls during shift. 2/4 side rails up. Bed in lowest position. Bed breaks locked. Bed side table and call light within reach. Bed alarm in place. Continue to monitor.

## 2017-11-08 NOTE — PROGRESS NOTES
Recent Labs      11/06/17   0714  11/07/17   0711   NA  140  137   K  3.5*  4.4   CL  104  106   CO2  19*  18*   BUN  42*  36*   CREATININE  1.47*  1.38*     BNP: No results for input(s): BNP in the last 72 hours. PT/INR:   Recent Labs      11/06/17   0714  11/07/17   0711  11/08/17   0506   PROTIME  37.7*  26.7*  24.1*   INR  3.5  2.5  2.2     APTT: No results for input(s): APTT in the last 72 hours. CARDIAC ENZYMES:   No results for input(s): CKMB, CKMBINDEX, TROPONINI in the last 72 hours. Invalid input(s): CKTOTAL;3  FASTING LIPID PANEL:No results found for: CHOL, HDL, TRIG  LIVER PROFILE:   No results for input(s): AST, ALT, ALB, BILIDIR, BILITOT, ALKPHOS in the last 72 hours. Assessment and Plan:   Principal Problem:    UMAIR (acute kidney injury) ([facility] [address]  Active Problems:    Dehydration    Renal artery stenosis (HCC)    CKD (chronic kidney disease) stage 3, GFR 30-59 ml/min    Breast cancer (HCC)    Alzheimer disease    Diarrhea    Fatigue    Paroxysmal tachycardia (HCC)    Hypothyroidism    Osteoporosis    A-fib (HCC)    Lactic acid acidosis    Conjunctivitis    Elevated CK       UMAIR on CKD likely secondary to dehydration -resolved  Hx of diarrhea and decreased oral intake      · Monitor renal function and urine output  · Renal US: Cystic lesion in the upper pole of the right kidney. Solid Lesion Lower Pole of the Left Kidney. · Creatinine improving . Trending down   · Monitor heart rate and BP. On Lopressor 25 MG     UTI in the setting of UMAIR     · Urine culture: Positive for group D enterococcus. Cipro 250 twice a day    Diarrhea- improved  · Stool culture  · C.  Diff PCR  · Fecal lactoferrin   · Calprotectin stool      Elevated pro-BNP  · No known hx of CHF  · 2-D echo : Normal  · No sign of fluid overload.      Conjunctivitis of Right eye - improving  · Ciprofloxacin eye drop       Hx of A-fib on coumadin   · Coumadin   · Monitor INR   · Pharmacy to manage Coumadin   · Lopressor 25 mg    Alzheimer disease   · Home medication: donepezil resumed         Diet: Renal Diet. GI prophylaxis. Pepcid 20 MG PO BID   PT/OT evaluation and treatment  . Discharge planning. Awaits Pre-cert   Code status: DNR-CCA        Blanca May MD   PGY-1  Department of Internal Medicine  [facility]         11/8/2017, 7:20 AM     Attending Physician Statement For Internal Medicine  I have discussed the care of Selena Burgos, including pertinent history and exam findings,  with the Resident. I have seen and examined the patient with the resident and reviewed the key elements of all parts of the encounter have been performed by me. I agree with the assessment, plan and orders as documented by the resident.     John Branham MD, MRCP Antonio Sullivan, FACP   11/8/2017 11:16 AM  Internal Medicine and Nephrology

## 2017-11-08 NOTE — PROGRESS NOTES
Pharmacy Note  Warfarin Consult follow-up      Recent Labs      11/08/17   0506   INR  2.2     No results for input(s): HGB, HCT, PLT in the last 72 hours. Significant Drug-Drug Interactions:  New warfarin drug-drug interactions: none  Discontinued drug-drug interactions: none  Current warfarin drug-drug interactions: Cipro, aspirin, levothyroxine. Date           INR                Dose  11/4/17       2.2                 5 mg  11/5/17       2.6                  1 mg  11/6/17       3.5                 hold  11/7/17       2.5                 2.5 mg  11/8/17       2.2                 4 mg    Notes:     Patient's INR is now therapeutic, will order warfarin 4 mg for this evening. This is a reduction from home regimen due to concurrent Cipro therapy. Daily PT/INR while inpatient. [address] [place] [address]  Ph., CACP, Clinical Pharmacist  Anticoagulation Services, [facility] Coumadin [facility]  11/8/2017  7:48 AM

## 2017-11-09 ENCOUNTER — APPOINTMENT (OUTPATIENT)
Dept: GENERAL RADIOLOGY | Age: 82
DRG: 683 | End: 2017-11-09
Payer: MEDICARE

## 2017-11-09 VITALS
SYSTOLIC BLOOD PRESSURE: 117 MMHG | HEART RATE: 127 BPM | OXYGEN SATURATION: 95 % | HEIGHT: 63 IN | BODY MASS INDEX: 26.21 KG/M2 | WEIGHT: 147.93 LBS | TEMPERATURE: 98.2 F | DIASTOLIC BLOOD PRESSURE: 76 MMHG | RESPIRATION RATE: 24 BRPM

## 2017-11-09 LAB
ANION GAP SERPL CALCULATED.3IONS-SCNC: 14 MMOL/L (ref 9–17)
BUN BLDV-MCNC: 33 MG/DL (ref 8–23)
BUN/CREAT BLD: ABNORMAL (ref 9–20)
CALCIUM SERPL-MCNC: 8.6 MG/DL (ref 8.6–10.4)
CHLORIDE BLD-SCNC: 107 MMOL/L (ref 98–107)
CO2: 19 MMOL/L (ref 20–31)
CREAT SERPL-MCNC: 1.17 MG/DL (ref 0.5–0.9)
GFR AFRICAN AMERICAN: 53 ML/MIN
GFR NON-AFRICAN AMERICAN: 43 ML/MIN
GFR SERPL CREATININE-BSD FRML MDRD: ABNORMAL ML/MIN/{1.73_M2}
GFR SERPL CREATININE-BSD FRML MDRD: ABNORMAL ML/MIN/{1.73_M2}
GLUCOSE BLD-MCNC: 103 MG/DL (ref 70–99)
INR BLD: 2.4
POTASSIUM SERPL-SCNC: 4.1 MMOL/L (ref 3.7–5.3)
PROTHROMBIN TIME: 26.4 SEC (ref 9.4–12.6)
SODIUM BLD-SCNC: 140 MMOL/L (ref 135–144)

## 2017-11-09 PROCEDURE — 71010 XR CHEST PORTABLE: CPT

## 2017-11-09 PROCEDURE — 2500000003 HC RX 250 WO HCPCS: Performed by: HOSPITALIST

## 2017-11-09 PROCEDURE — 97110 THERAPEUTIC EXERCISES: CPT

## 2017-11-09 PROCEDURE — 6370000000 HC RX 637 (ALT 250 FOR IP): Performed by: STUDENT IN AN ORGANIZED HEALTH CARE EDUCATION/TRAINING PROGRAM

## 2017-11-09 PROCEDURE — 85610 PROTHROMBIN TIME: CPT

## 2017-11-09 PROCEDURE — 6370000000 HC RX 637 (ALT 250 FOR IP): Performed by: HOSPITALIST

## 2017-11-09 PROCEDURE — 36415 COLL VENOUS BLD VENIPUNCTURE: CPT

## 2017-11-09 PROCEDURE — 2580000003 HC RX 258: Performed by: EMERGENCY MEDICINE

## 2017-11-09 PROCEDURE — 80048 BASIC METABOLIC PNL TOTAL CA: CPT

## 2017-11-09 PROCEDURE — 6360000002 HC RX W HCPCS: Performed by: STUDENT IN AN ORGANIZED HEALTH CARE EDUCATION/TRAINING PROGRAM

## 2017-11-09 RX ORDER — ALBUTEROL SULFATE 90 UG/1
2 AEROSOL, METERED RESPIRATORY (INHALATION) EVERY 6 HOURS PRN
Qty: 1 INHALER | Refills: 3 | Status: SHIPPED | OUTPATIENT
Start: 2017-11-09

## 2017-11-09 RX ORDER — FUROSEMIDE 10 MG/ML
20 INJECTION INTRAMUSCULAR; INTRAVENOUS ONCE
Status: COMPLETED | OUTPATIENT
Start: 2017-11-09 | End: 2017-11-09

## 2017-11-09 RX ORDER — FUROSEMIDE 20 MG/1
20 TABLET ORAL DAILY
Status: DISCONTINUED | OUTPATIENT
Start: 2017-11-09 | End: 2017-11-09

## 2017-11-09 RX ORDER — CIPROFLOXACIN 250 MG/1
250 TABLET, FILM COATED ORAL EVERY 12 HOURS SCHEDULED
Qty: 2 TABLET | Refills: 0 | Status: SHIPPED | OUTPATIENT
Start: 2017-11-09 | End: 2017-11-10

## 2017-11-09 RX ORDER — WARFARIN SODIUM 2.5 MG/1
2.5 TABLET ORAL
Status: COMPLETED | OUTPATIENT
Start: 2017-11-09 | End: 2017-11-09

## 2017-11-09 RX ORDER — CIPROFLOXACIN HYDROCHLORIDE 3.5 MG/ML
1 SOLUTION/ DROPS TOPICAL
Qty: 5 DROP | Refills: 0 | Status: SHIPPED | OUTPATIENT
Start: 2017-11-09 | End: 2017-11-10

## 2017-11-09 RX ORDER — FUROSEMIDE 20 MG/1
20 TABLET ORAL DAILY
Status: DISCONTINUED | OUTPATIENT
Start: 2017-11-10 | End: 2017-11-09

## 2017-11-09 RX ORDER — ALBUTEROL SULFATE 90 UG/1
2 AEROSOL, METERED RESPIRATORY (INHALATION) EVERY 6 HOURS PRN
Status: DISCONTINUED | OUTPATIENT
Start: 2017-11-09 | End: 2017-11-09 | Stop reason: HOSPADM

## 2017-11-09 RX ADMIN — METOPROLOL TARTRATE 5 MG: 1 INJECTION, SOLUTION INTRAVENOUS at 09:32

## 2017-11-09 RX ADMIN — CIPROFLOXACIN 250 MG: 250 TABLET, FILM COATED ORAL at 09:32

## 2017-11-09 RX ADMIN — WARFARIN SODIUM 2.5 MG: 2.5 TABLET ORAL at 18:38

## 2017-11-09 RX ADMIN — CIPROFLOXACIN HYDROCHLORIDE 1 DROP: 3.5 SOLUTION/ DROPS TOPICAL at 18:34

## 2017-11-09 RX ADMIN — METOPROLOL TARTRATE 5 MG: 1 INJECTION, SOLUTION INTRAVENOUS at 04:14

## 2017-11-09 RX ADMIN — METOPROLOL TARTRATE 12.5 MG: 25 TABLET ORAL at 10:59

## 2017-11-09 RX ADMIN — CIPROFLOXACIN HYDROCHLORIDE 1 DROP: 3.5 SOLUTION/ DROPS TOPICAL at 09:32

## 2017-11-09 RX ADMIN — ASPIRIN 81 MG: 81 TABLET, CHEWABLE ORAL at 09:32

## 2017-11-09 RX ADMIN — LEVOTHYROXINE SODIUM 75 MCG: 75 TABLET ORAL at 09:32

## 2017-11-09 RX ADMIN — Medication 10 ML: at 09:33

## 2017-11-09 RX ADMIN — FUROSEMIDE 20 MG: 10 INJECTION, SOLUTION INTRAVENOUS at 13:46

## 2017-11-09 RX ADMIN — DONEPEZIL HYDROCHLORIDE 10 MG: 10 TABLET, FILM COATED ORAL at 09:32

## 2017-11-09 RX ADMIN — FAMOTIDINE 20 MG: 20 TABLET, FILM COATED ORAL at 09:32

## 2017-11-09 RX ADMIN — CIPROFLOXACIN HYDROCHLORIDE 1 DROP: 3.5 SOLUTION/ DROPS TOPICAL at 14:49

## 2017-11-09 ASSESSMENT — PAIN SCALES - GENERAL: PAINLEVEL_OUTOF10: 0

## 2017-11-09 NOTE — PROGRESS NOTES
Pharmacy Note  Warfarin Consult follow-up      Recent Labs      11/09/17   0511   INR  2.4     No results for input(s): HGB, HCT, PLT in the last 72 hours. Significant Drug-Drug Interactions:  New warfarin drug-drug interactions: none  Discontinued drug-drug interactions: none  Current warfarin drug-drug interactions: Cipro, aspirin, levothyroxine. Date           INR                Dose  11/4/17       2.2                 5 mg  11/5/17       2.6                  1 mg  11/6/17       3.5                 hold  11/7/17       2.5                 2.5 mg  11/8/17       2.2                 4 mg  11/9/17       2.4                 2.5 mg    Notes:        Ordered warfarin 2.5 mg for this evening per home regimen. Daily PT/INR while inpatient. [address] [place] [address]  Ph., CACP, Clinical Pharmacist  Anticoagulation Services, [address] [place] [address] Coumadin [facility]  11/9/2017  7:48 AM

## 2017-11-09 NOTE — CARE COORDINATION
Received authorization from insurance [employer] for [address] done, faxed domenica and [employer] . Rina Kyler will  at 6:30  Son here and aware of time.

## 2017-11-09 NOTE — PROGRESS NOTES
fatigue  Comments: BLE AROM x 10 including ankle pumps, marches and LAQ  Pt in A-fib with HR increasing up to 153bpm with exercise, labored breathing noted, O2 WNL                        Assessment   Body structures, Functions, Activity limitations: Decreased functional mobility ; Decreased strength;Decreased safe awareness;Decreased cognition;Decreased endurance;Decreased balance  Assessment: unable to amb this date d/t HR, Fatigue and labored breathing with exercise, HR increased up to 153bpm  Prognosis: Good  REQUIRES PT FOLLOW UP: Yes  Activity Tolerance  Activity Tolerance: Treatment limited secondary to medical complications (free text) (A-fib with HR up to 153bpm in chair with ther ex)  PT Equipment Recommendations  Equipment Needed:  (TBD)       Discharge Recommendations:  Subacute/Skilled Nursing Facility            Goals  Short term goals  Time Frame for Short term goals: 12 visits  Short term goal 1: independent bed mobility  Short term goal 2: independent transfers  Short term goal 3: independent gait with rw x 30 x2  Short term goal 4: stair ambulation x 3 steps with 1 HR, min A+1   Patient Goals   Patient goals : pt didn't state goal; agreed that it would be nice to go home with her son once her heart is better    Plan    Plan  Times per week: 6 visits weekly  Times per day: Daily  Current Treatment Recommendations: Strengthening, ROM, Balance Training, Functional Mobility Training, Transfer Training, Cognitive/Perceptual Training, Endurance Training, Gait Training, Stair training, Cognitive Reorientation, Home Exercise Program, Safety Education & Training, Patient/Caregiver Education & Training, Equipment Evaluation, Education, & procurement, Positioning  Safety Devices  Type of devices: Call light within reach, Chair alarm in place, Gait belt, Patient at risk for falls, Left in chair, Nurse notified     Therapy Time   Individual Concurrent Group Co-treatment   Time In 1011         Time Out 1030

## 2017-11-09 NOTE — PROGRESS NOTES
Paged for evaluation of patient. Patient in afib HR around 110. Patient is clear to ascultation bilaterally, increased HR with exertion, such as moving in bed and deep inspiration, but HR decreases down to 110s with rest. Patient is in not acute respiratory distress and remains in afib.  Can give lopressor that is ordered PRN if HR as per primary team.    Marianna Todd MD      Department of Internal Medicine  [facility]         11/9/2017, 5:57 PM

## 2017-11-09 NOTE — PROGRESS NOTES
[facility] [address]  Occupational Therapy Not Seen Note    Patient not available for Occupational Therapy due to:    [] Testing:    [] Hemodialysis    [] Blood Transfusion in Progress    []Refusal by Patient:    [] Surgery/Procedure:    [] Strict Bedrest    [] Sedation    [] Spine Precautions     [] Pt being transferred to palliative care at this time. Spoke with pt/family and OT services to be defered. [] Pt independent with functional mobility and functional tasks.  Pt with no OT acute care needs at this time, will defer OT eval.    [x] Other: just completed PT session, per PTA, pt requesting rest after stopping PT session    Next Scheduled Treatment: Re-check 11/9 or 11/10/2017    Signature: HERMINIO Barron/ELVIRA

## 2017-11-09 NOTE — PROGRESS NOTES
[address] [place] [address]     Department of Internal Medicine - Staff Internal Medicine Service     DAILY PROGRESS NOTE  TEAM       Patient:  Steven Suarez  YOB: 1927  MRN: [id number]     Acct: [de-identified]     Admit date: 11/4/2017  Admitting Diagnosis: UMAIR (acute kidney injury) ([facility] [address]    Subjective:   Pt seen and evaluated at bedside  No acute overnight events  Continues to be tachycardic. - 134  She is having some wheezing. No signs of fluid overload. Pt seems to desat with exertion, currently on 2L NC.   Awaits pre-cert     Objective:   BP (!) 125/98   Pulse 134   Temp 98.5 °F (36.9 °C) (Oral)   Resp 20   Ht 5' 3\" (1.6 m)   Wt 148 lb 13 oz (67.5 kg)   SpO2 96%   BMI 26.36 kg/m²     General appearance - alert, well appearing, in no acute distress  Mental status - alert and oriented x3   Mouth - mucus membrane moist.   Chest - clear to auscultation, Positive for wheezes,No rales or rhonchi, symmetric air entry  Heart - normal rate, regular rhythm, normal S1, S2, no murmurs, rubs, clicks or gallops  Abdomen - soft, nontender, nondistended, no masses or organomegaly  Extremities - peripheral pulses normal, no pedal edema, no clubbing or cyanosis      Intake/Output Summary (Last 24 hours) at 11/09/17 0359  Last data filed at 11/08/17 1555   Gross per 24 hour   Intake          1605.78 ml   Output              500 ml   Net          1105.78 ml         Medications:      metoprolol tartrate  25 mg Oral Daily    famotidine  20 mg Oral Daily    ciprofloxacin  250 mg Oral 2 times per day    sodium chloride flush  10 mL Intravenous 2 times per day    aspirin  81 mg Oral Daily    donepezil  10 mg Oral BID    levothyroxine  75 mcg Oral Daily    ciprofloxacin  1 drop Both Eyes Q4H While awake    warfarin (COUMADIN) daily dosing (placeholder)   Other RX Placeholder       Diagnostic Labs and Imaging    CBC:   No results for input(s): WBC, RBC, HGB, HCT, MCV, RDW, PLT in the last 72 hours.  BMP:   Recent Labs      11/06/17 0714  11/07/17   0711  11/08/17   0506   NA  140  137  144   K  3.5*  4.4  4.1   CL  104  106  110*   CO2  19*  18*  20   BUN  42*  36*  32*   CREATININE  1.47*  1.38*  1.20*     BNP: No results for input(s): BNP in the last 72 hours. PT/INR:   Recent Labs      11/06/17 0714 11/07/17 0711  11/08/17   0506   PROTIME  37.7*  26.7*  24.1*   INR  3.5  2.5  2.2     APTT: No results for input(s): APTT in the last 72 hours. CARDIAC ENZYMES:   No results for input(s): CKMB, CKMBINDEX, TROPONINI in the last 72 hours. Invalid input(s): CKTOTAL;3  FASTING LIPID PANEL:No results found for: CHOL, HDL, TRIG  LIVER PROFILE:   No results for input(s): AST, ALT, ALB, BILIDIR, BILITOT, ALKPHOS in the last 72 hours. Assessment and Plan:   Principal Problem:    UMAIR (acute kidney injury) ([facility] [address]  Active Problems:    Dehydration    Renal artery stenosis (HCC)    CKD (chronic kidney disease) stage 3, GFR 30-59 ml/min    Breast cancer (HCC)    Alzheimer disease    Diarrhea    Fatigue    Paroxysmal tachycardia (HCC)    Hypothyroidism    Osteoporosis    A-fib (HCC)    Lactic acid acidosis    Conjunctivitis    Elevated CK       UMAIR on CKD likely secondary to dehydration -resolved  Hx of diarrhea and decreased oral intake      · Monitor renal function and urine output  · Renal US: Cystic lesion in the upper pole of the right kidney. Solid Lesion Lower Pole of the Left Kidney. · Creatinine improving . Trending down   · Monitor heart rate and BP. On Lopressor 12.5 mg BID      UTI in the setting of UMAIR     · Urine culture: Positive for group D enterococcus. Cipro 250 twice a day    Diarrhea- improved  · Stool culture  · C.  Diff PCR  · Fecal lactoferrin   · Calprotectin stool      Elevated pro-BNP  · No known hx of CHF  · 2-D echo : Normal  · No sign of fluid overload.      Conjunctivitis of Right eye - improving  · Ciprofloxacin eye drop       Hx of A-fib on coumadin   · Coumadin · Monitor INR   · Pharmacy to manage Coumadin   · Lopressor 25 mg      Alzheimer disease   · Home medication: donepezil resumed      Dyspnea on exertion and wheezing  CXR      Diet: Renal Diet. GI prophylaxis. Pepcid 20 MG PO BID   PT/OT evaluation and treatment  . Discharge planning. Awaits Pre-cert   Code status: DNR-CCA        Sandi Carpio MD   PGY-1  Department of Internal Medicine  [facility]         11/9/2017, 3:59 AM   Attending Physician Statement For Internal Medicine  I have discussed the care of Erlinda Konwles, including pertinent history and exam findings,  with the Resident. I have seen and examined the patient with the resident and reviewed the key elements of all parts of the encounter have been performed by me. I agree with the assessment, plan and orders as documented by the resident.     Mary Garibay MD, MRCP Nirmala Blanca, FACP   11/9/2017 11:44 AM  Internal Medicine and Nephrology

## 2017-11-10 LAB
CULTURE: NORMAL
Lab: NORMAL
Lab: NORMAL
SPECIMEN DESCRIPTION: NORMAL
SPECIMEN DESCRIPTION: NORMAL
STATUS: NORMAL
STATUS: NORMAL

## 2017-11-10 NOTE — PLAN OF CARE
Problem: Falls - Risk of  Goal: Absence of falls  Outcome: Completed Date Met: 11/09/17      Problem: Risk for Impaired Skin Integrity  Goal: Tissue integrity - skin and mucous membranes  Structural intactness and normal physiological function of skin and  mucous membranes.    Outcome: Completed Date Met: 11/09/17      Problem: Musculor/Skeletal Functional Status  Goal: Highest potential functional level  Outcome: Completed Date Met: 11/09/17    Goal: Absence of falls  Outcome: Completed Date Met: 11/09/17      Problem: Musculor/Skeletal Functional Status  Goal: Highest potential functional level  Outcome: Completed Date Met: 11/09/17    Goal: Absence of falls  Outcome: Completed Date Met: 11/09/17      Problem: Pain:  Goal: Pain level will decrease  Pain level will decrease   Outcome: Completed Date Met: 11/09/17    Goal: Control of acute pain  Control of acute pain   Outcome: Completed Date Met: 11/09/17    Goal: Control of chronic pain  Control of chronic pain   Outcome: Completed Date Met: 11/09/17

## 2017-11-11 NOTE — DISCHARGE SUMMARY
[facility]   Department of Internal Medicine - Staff Internal Medicine Service    INPATIENT DISCHARGE SUMMARY      PATIENT IDENTIFICATION:  NAME: Clay Mccormick : 10/10/1927 MRN: [id number]  ACCT: [de-identified]     Admit Date: 2017  Discharge date: 2017  7:50 PM     Attending Provider: Radha att. providers found                                     Dictating Provider: Koby Julian MD  ______________________________________________________________________________    REASON FOR HOSPITALIZATION:     Principal Problem:    UMAIR (acute kidney injury) ([facility] [address]  Active Problems:    Dehydration    Renal artery stenosis (HCC)    CKD (chronic kidney disease) stage 3, GFR 30-59 ml/min    Breast cancer ([facility] [address]    Alzheimer disease    Diarrhea    Fatigue    Paroxysmal tachycardia (HCC)    Hypothyroidism    Osteoporosis    A-fib (HCC)    Lactic acid acidosis    Conjunctivitis    Elevated CK        HOSPITAL COURSE AND TREATMENT:  Clay Mccormick is a 80 y. o.F Presented to ER with difficulty ambulating and fatigue. Pt's brother at bedside, reporting that pt has had loose stool for the past 3 days. She also had decrease oral intake. No associated fever, chills, nausea or vomiting. No hematochezia or melena. Upon admission, pt was A&O, hypotensive (BP 98/58), tachycardic () and afebrile.      Admission labs showed Lactic of 3.5, elevated CK (2,571) CK-MB (49.5), elevated pro-BNP (27,272)  Pt denies any hx of diagnosed CHF.    Pt was admitted for diagnosis of UMAIR secondary to dehydration. She was given IV fluid hydration. Her home medication of Lisinopril and HCTZ were held. Urine was positive for Group D Strep and was started on Cipro 250 BID. Diarrhea improved. 2D echo result was normal.   She was restarted on her Coumadin for Afib. During the course of hospital admission, pt had episodes of tachycardia and MOSES.  Pt was started on Lopressor 25 mg daily, which was later changed to 12.5

## 2018-04-12 PROBLEM — E86.0 DEHYDRATION: Status: RESOLVED | Noted: 2017-11-04 | Resolved: 2018-04-12

## 2019-02-18 ENCOUNTER — HOSPITAL ENCOUNTER (OUTPATIENT)
Age: 84
Setting detail: SPECIMEN
Discharge: HOME | End: 2019-02-18
Payer: MEDICARE

## 2019-02-18 LAB
INR BLD: 1.6
PROTHROMBIN TIME: 15.9 SEC (ref 9.7–11.6)

## 2019-02-18 PROCEDURE — 85610 PROTHROMBIN TIME: CPT

## 2019-02-18 PROCEDURE — P9603 ONE-WAY ALLOW PRORATED MILES: HCPCS

## 2019-02-18 PROCEDURE — 36415 COLL VENOUS BLD VENIPUNCTURE: CPT

## 2019-02-20 ENCOUNTER — HOSPITAL ENCOUNTER (OUTPATIENT)
Age: 84
Setting detail: SPECIMEN
Discharge: HOME | End: 2019-02-20
Payer: MEDICARE

## 2019-02-20 LAB
INR BLD: 1.3
PROTHROMBIN TIME: 13.6 SEC (ref 9.7–11.6)

## 2019-02-20 PROCEDURE — 85610 PROTHROMBIN TIME: CPT

## 2019-02-20 PROCEDURE — 36415 COLL VENOUS BLD VENIPUNCTURE: CPT

## 2019-02-20 PROCEDURE — P9603 ONE-WAY ALLOW PRORATED MILES: HCPCS

## 2019-02-25 ENCOUNTER — HOSPITAL ENCOUNTER (OUTPATIENT)
Age: 84
Setting detail: SPECIMEN
Discharge: HOME | End: 2019-02-25
Payer: MEDICARE

## 2019-02-25 LAB
INR BLD: 2.1
PROTHROMBIN TIME: 21 SEC (ref 9.7–11.6)

## 2019-02-25 PROCEDURE — 85610 PROTHROMBIN TIME: CPT

## 2019-02-25 PROCEDURE — 36415 COLL VENOUS BLD VENIPUNCTURE: CPT

## 2019-02-25 PROCEDURE — P9603 ONE-WAY ALLOW PRORATED MILES: HCPCS

## 2019-03-04 ENCOUNTER — HOSPITAL ENCOUNTER (OUTPATIENT)
Age: 84
Setting detail: SPECIMEN
Discharge: HOME | End: 2019-03-04
Payer: MEDICARE

## 2019-03-04 LAB
INR BLD: 2.7
PROTHROMBIN TIME: 26.9 SEC (ref 9.7–11.6)

## 2019-03-04 PROCEDURE — 36415 COLL VENOUS BLD VENIPUNCTURE: CPT

## 2019-03-04 PROCEDURE — 85610 PROTHROMBIN TIME: CPT

## 2019-03-04 PROCEDURE — P9603 ONE-WAY ALLOW PRORATED MILES: HCPCS

## 2019-03-11 ENCOUNTER — HOSPITAL ENCOUNTER (OUTPATIENT)
Age: 84
Setting detail: SPECIMEN
Discharge: HOME | End: 2019-03-11
Payer: MEDICARE

## 2019-03-11 LAB
INR BLD: 2.8
PROTHROMBIN TIME: 27.4 SEC (ref 9.7–11.6)

## 2019-03-11 PROCEDURE — 85610 PROTHROMBIN TIME: CPT

## 2019-03-11 PROCEDURE — 36415 COLL VENOUS BLD VENIPUNCTURE: CPT

## 2019-03-11 PROCEDURE — P9603 ONE-WAY ALLOW PRORATED MILES: HCPCS

## 2019-03-18 ENCOUNTER — HOSPITAL ENCOUNTER (OUTPATIENT)
Age: 84
Setting detail: SPECIMEN
Discharge: HOME | End: 2019-03-18
Payer: MEDICARE

## 2019-03-18 LAB
INR BLD: 2.5
PROTHROMBIN TIME: 24.2 SEC (ref 9.7–11.6)

## 2019-03-18 PROCEDURE — 85610 PROTHROMBIN TIME: CPT

## 2019-03-18 PROCEDURE — P9603 ONE-WAY ALLOW PRORATED MILES: HCPCS

## 2019-03-18 PROCEDURE — 36415 COLL VENOUS BLD VENIPUNCTURE: CPT

## 2019-03-26 ENCOUNTER — HOSPITAL ENCOUNTER (OUTPATIENT)
Age: 84
Setting detail: SPECIMEN
Discharge: HOME | End: 2019-03-26
Payer: MEDICARE

## 2019-03-26 LAB
INR BLD: 3.4
PROTHROMBIN TIME: 33.8 SEC (ref 9.7–11.6)

## 2019-03-26 PROCEDURE — P9603 ONE-WAY ALLOW PRORATED MILES: HCPCS

## 2019-03-26 PROCEDURE — 36415 COLL VENOUS BLD VENIPUNCTURE: CPT

## 2019-03-26 PROCEDURE — 85610 PROTHROMBIN TIME: CPT

## 2019-03-27 ENCOUNTER — HOSPITAL ENCOUNTER (OUTPATIENT)
Age: 84
Setting detail: SPECIMEN
Discharge: HOME | End: 2019-03-27
Payer: MEDICARE

## 2019-03-27 LAB
INR BLD: 2.9
PROTHROMBIN TIME: 29 SEC (ref 9.7–11.6)

## 2019-03-27 PROCEDURE — P9603 ONE-WAY ALLOW PRORATED MILES: HCPCS

## 2019-03-27 PROCEDURE — 36415 COLL VENOUS BLD VENIPUNCTURE: CPT

## 2019-03-27 PROCEDURE — 85610 PROTHROMBIN TIME: CPT

## 2019-04-03 ENCOUNTER — HOSPITAL ENCOUNTER (OUTPATIENT)
Age: 84
Setting detail: SPECIMEN
Discharge: HOME | End: 2019-04-03
Payer: MEDICARE

## 2019-04-03 LAB
INR BLD: 2
PROTHROMBIN TIME: 20.3 SEC (ref 9.7–11.6)

## 2019-04-03 PROCEDURE — 85610 PROTHROMBIN TIME: CPT

## 2019-04-03 PROCEDURE — P9603 ONE-WAY ALLOW PRORATED MILES: HCPCS

## 2019-04-03 PROCEDURE — 36415 COLL VENOUS BLD VENIPUNCTURE: CPT

## 2019-04-10 ENCOUNTER — HOSPITAL ENCOUNTER (OUTPATIENT)
Age: 84
Setting detail: SPECIMEN
Discharge: HOME | End: 2019-04-10
Payer: MEDICARE

## 2019-04-10 LAB
INR BLD: 2
PROTHROMBIN TIME: 20.3 SEC (ref 9.7–11.6)

## 2019-04-10 PROCEDURE — 36415 COLL VENOUS BLD VENIPUNCTURE: CPT

## 2019-04-10 PROCEDURE — P9603 ONE-WAY ALLOW PRORATED MILES: HCPCS

## 2019-04-10 PROCEDURE — 85610 PROTHROMBIN TIME: CPT

## 2019-04-17 ENCOUNTER — HOSPITAL ENCOUNTER (OUTPATIENT)
Age: 84
Setting detail: SPECIMEN
Discharge: HOME | End: 2019-04-17
Payer: MEDICARE

## 2019-04-17 LAB
INR BLD: 3.5
PROTHROMBIN TIME: 34.6 SEC (ref 9.7–11.6)

## 2019-04-17 PROCEDURE — P9603 ONE-WAY ALLOW PRORATED MILES: HCPCS

## 2019-04-17 PROCEDURE — 85610 PROTHROMBIN TIME: CPT

## 2019-04-17 PROCEDURE — 36415 COLL VENOUS BLD VENIPUNCTURE: CPT

## 2019-04-19 ENCOUNTER — HOSPITAL ENCOUNTER (OUTPATIENT)
Age: 84
Setting detail: SPECIMEN
Discharge: HOME | End: 2019-04-19
Payer: MEDICARE

## 2019-04-19 LAB
INR BLD: 3.3
PROTHROMBIN TIME: 32.3 SEC (ref 9.7–11.6)

## 2019-04-19 PROCEDURE — 85610 PROTHROMBIN TIME: CPT

## 2019-04-19 PROCEDURE — 36415 COLL VENOUS BLD VENIPUNCTURE: CPT

## 2019-04-19 PROCEDURE — P9603 ONE-WAY ALLOW PRORATED MILES: HCPCS

## 2019-04-20 ENCOUNTER — HOSPITAL ENCOUNTER (OUTPATIENT)
Age: 84
Setting detail: SPECIMEN
Discharge: HOME | End: 2019-04-20
Payer: MEDICARE

## 2019-04-20 LAB
INR BLD: 2.4
PROTHROMBIN TIME: 23.5 SEC (ref 9.7–11.6)

## 2019-04-20 PROCEDURE — 36415 COLL VENOUS BLD VENIPUNCTURE: CPT

## 2019-04-20 PROCEDURE — P9603 ONE-WAY ALLOW PRORATED MILES: HCPCS

## 2019-04-20 PROCEDURE — 85610 PROTHROMBIN TIME: CPT

## 2019-04-26 ENCOUNTER — HOSPITAL ENCOUNTER (OUTPATIENT)
Age: 84
Setting detail: SPECIMEN
Discharge: HOME | End: 2019-04-26
Payer: MEDICARE

## 2019-04-26 LAB
INR BLD: 2.5
PROTHROMBIN TIME: 25 SEC (ref 9.7–11.6)

## 2019-04-26 PROCEDURE — 85610 PROTHROMBIN TIME: CPT

## 2019-04-26 PROCEDURE — 36415 COLL VENOUS BLD VENIPUNCTURE: CPT

## 2019-04-26 PROCEDURE — P9603 ONE-WAY ALLOW PRORATED MILES: HCPCS

## 2019-05-03 ENCOUNTER — HOSPITAL ENCOUNTER (OUTPATIENT)
Age: 84
Setting detail: SPECIMEN
Discharge: HOME | End: 2019-05-03
Payer: MEDICARE

## 2019-05-03 LAB
INR BLD: 2.6
PROTHROMBIN TIME: 25.5 SEC (ref 9.7–11.6)

## 2019-05-03 PROCEDURE — 85610 PROTHROMBIN TIME: CPT

## 2019-05-03 PROCEDURE — P9603 ONE-WAY ALLOW PRORATED MILES: HCPCS

## 2019-05-03 PROCEDURE — 36415 COLL VENOUS BLD VENIPUNCTURE: CPT

## 2019-05-10 ENCOUNTER — HOSPITAL ENCOUNTER (OUTPATIENT)
Age: 84
Setting detail: SPECIMEN
Discharge: HOME | End: 2019-05-10
Payer: MEDICARE

## 2019-05-10 LAB
INR BLD: 2.6
PROTHROMBIN TIME: 25.5 SEC (ref 9.7–11.6)

## 2019-05-10 PROCEDURE — 36415 COLL VENOUS BLD VENIPUNCTURE: CPT

## 2019-05-10 PROCEDURE — P9603 ONE-WAY ALLOW PRORATED MILES: HCPCS

## 2019-05-10 PROCEDURE — 85610 PROTHROMBIN TIME: CPT

## 2019-05-17 ENCOUNTER — HOSPITAL ENCOUNTER (OUTPATIENT)
Age: 84
Setting detail: SPECIMEN
Discharge: HOME | End: 2019-05-17
Payer: MEDICARE

## 2019-05-17 LAB
INR BLD: 2.4
PROTHROMBIN TIME: 24 SEC (ref 9.7–11.6)

## 2019-05-17 PROCEDURE — P9603 ONE-WAY ALLOW PRORATED MILES: HCPCS

## 2019-05-17 PROCEDURE — 85610 PROTHROMBIN TIME: CPT

## 2019-05-17 PROCEDURE — 36415 COLL VENOUS BLD VENIPUNCTURE: CPT

## 2019-05-24 ENCOUNTER — HOSPITAL ENCOUNTER (OUTPATIENT)
Age: 84
Setting detail: SPECIMEN
Discharge: HOME | End: 2019-05-24
Payer: MEDICARE

## 2019-05-24 LAB
INR BLD: 3.8
PROTHROMBIN TIME: 37.3 SEC (ref 9.7–11.6)

## 2019-05-24 PROCEDURE — 85610 PROTHROMBIN TIME: CPT

## 2019-05-24 PROCEDURE — P9603 ONE-WAY ALLOW PRORATED MILES: HCPCS

## 2019-05-24 PROCEDURE — 36415 COLL VENOUS BLD VENIPUNCTURE: CPT

## 2019-05-26 ENCOUNTER — HOSPITAL ENCOUNTER (OUTPATIENT)
Age: 84
Setting detail: SPECIMEN
Discharge: HOME | End: 2019-05-26
Payer: MEDICARE

## 2019-05-26 LAB
INR BLD: 2.2
PROTHROMBIN TIME: 21.8 SEC (ref 9.7–11.6)

## 2019-05-26 PROCEDURE — 36415 COLL VENOUS BLD VENIPUNCTURE: CPT

## 2019-05-26 PROCEDURE — 85610 PROTHROMBIN TIME: CPT

## 2019-05-26 PROCEDURE — P9603 ONE-WAY ALLOW PRORATED MILES: HCPCS
